# Patient Record
Sex: FEMALE | Race: BLACK OR AFRICAN AMERICAN | Employment: UNEMPLOYED | RURAL
[De-identification: names, ages, dates, MRNs, and addresses within clinical notes are randomized per-mention and may not be internally consistent; named-entity substitution may affect disease eponyms.]

---

## 2021-04-24 ENCOUNTER — HOSPITAL ENCOUNTER (INPATIENT)
Age: 26
LOS: 6 days | Discharge: HOME | DRG: 885 | End: 2021-04-30
Attending: PSYCHIATRY & NEUROLOGY | Admitting: PSYCHIATRY & NEUROLOGY
Payer: OTHER GOVERNMENT

## 2021-04-24 ENCOUNTER — HOSPITAL ENCOUNTER (EMERGENCY)
Age: 26
Discharge: PSYCHIATRIC OR SUBSTANCE USE FACILITY | End: 2021-04-24
Attending: EMERGENCY MEDICINE
Payer: OTHER GOVERNMENT

## 2021-04-24 VITALS
HEIGHT: 64 IN | HEART RATE: 84 BPM | WEIGHT: 167 LBS | SYSTOLIC BLOOD PRESSURE: 121 MMHG | DIASTOLIC BLOOD PRESSURE: 80 MMHG | OXYGEN SATURATION: 98 % | RESPIRATION RATE: 17 BRPM | TEMPERATURE: 98.2 F | BODY MASS INDEX: 28.51 KG/M2

## 2021-04-24 DIAGNOSIS — R45.851 SUICIDAL IDEATION: Primary | ICD-10-CM

## 2021-04-24 DIAGNOSIS — F43.10 PTSD (POST-TRAUMATIC STRESS DISORDER): ICD-10-CM

## 2021-04-24 DIAGNOSIS — F33.2 SEVERE EPISODE OF RECURRENT MAJOR DEPRESSIVE DISORDER, WITHOUT PSYCHOTIC FEATURES (HCC): ICD-10-CM

## 2021-04-24 PROBLEM — F32.9 MAJOR DEPRESSION: Status: ACTIVE | Noted: 2021-04-24

## 2021-04-24 LAB
ALBUMIN SERPL-MCNC: 4.4 G/DL (ref 3.4–5)
ALBUMIN/GLOB SERPL: 1.3 {RATIO} (ref 0.8–1.7)
ALP SERPL-CCNC: 49 U/L (ref 45–117)
ALT SERPL-CCNC: 23 U/L (ref 13–56)
AMPHET UR QL SCN: NEGATIVE
ANION GAP SERPL CALC-SCNC: 7 MMOL/L (ref 3–18)
APAP SERPL-MCNC: <2 UG/ML (ref 10–30)
APPEARANCE UR: CLEAR
AST SERPL-CCNC: 12 U/L (ref 10–38)
BACTERIA URNS QL MICRO: ABNORMAL /HPF
BARBITURATES UR QL SCN: NEGATIVE
BASOPHILS # BLD: 0 K/UL (ref 0–0.1)
BASOPHILS NFR BLD: 1 % (ref 0–2)
BENZODIAZ UR QL: NEGATIVE
BILIRUB SERPL-MCNC: 0.3 MG/DL (ref 0.2–1)
BILIRUB UR QL: NEGATIVE
BUN SERPL-MCNC: 10 MG/DL (ref 7–18)
BUN/CREAT SERPL: 10 (ref 12–20)
CALCIUM SERPL-MCNC: 9.7 MG/DL (ref 8.5–10.1)
CANNABINOIDS UR QL SCN: NEGATIVE
CHLORIDE SERPL-SCNC: 103 MMOL/L (ref 100–111)
CO2 SERPL-SCNC: 27 MMOL/L (ref 21–32)
COCAINE UR QL SCN: NEGATIVE
COLOR UR: YELLOW
COVID-19 RAPID TEST, COVR: NOT DETECTED
CREAT SERPL-MCNC: 0.98 MG/DL (ref 0.6–1.3)
DIFFERENTIAL METHOD BLD: ABNORMAL
EOSINOPHIL # BLD: 0.1 K/UL (ref 0–0.4)
EOSINOPHIL NFR BLD: 1 % (ref 0–5)
EPITH CASTS URNS QL MICRO: ABNORMAL /LPF (ref 0–5)
ERYTHROCYTE [DISTWIDTH] IN BLOOD BY AUTOMATED COUNT: 14.6 % (ref 11.6–14.5)
ETHANOL SERPL-MCNC: <3 MG/DL (ref 0–3)
FLUAV RNA SPEC QL NAA+PROBE: NOT DETECTED
FLUBV RNA SPEC QL NAA+PROBE: NOT DETECTED
GLOBULIN SER CALC-MCNC: 3.3 G/DL (ref 2–4)
GLUCOSE SERPL-MCNC: 102 MG/DL (ref 74–99)
GLUCOSE UR STRIP.AUTO-MCNC: NEGATIVE MG/DL
HCG SERPL QL: NEGATIVE
HCT VFR BLD AUTO: 43.4 % (ref 35–45)
HDSCOM,HDSCOM: NORMAL
HGB BLD-MCNC: 14.1 G/DL (ref 12–16)
HGB UR QL STRIP: ABNORMAL
KETONES UR QL STRIP.AUTO: NEGATIVE MG/DL
LEUKOCYTE ESTERASE UR QL STRIP.AUTO: NEGATIVE
LYMPHOCYTES # BLD: 2 K/UL (ref 0.9–3.6)
LYMPHOCYTES NFR BLD: 33 % (ref 21–52)
MCH RBC QN AUTO: 26.4 PG (ref 24–34)
MCHC RBC AUTO-ENTMCNC: 32.5 G/DL (ref 31–37)
MCV RBC AUTO: 81.3 FL (ref 74–97)
METHADONE UR QL: NEGATIVE
MONOCYTES # BLD: 0.3 K/UL (ref 0.05–1.2)
MONOCYTES NFR BLD: 5 % (ref 3–10)
NEUTS SEG # BLD: 3.6 K/UL (ref 1.8–8)
NEUTS SEG NFR BLD: 61 % (ref 40–73)
NITRITE UR QL STRIP.AUTO: NEGATIVE
OPIATES UR QL: NEGATIVE
PCP UR QL: NEGATIVE
PH UR STRIP: 6 [PH] (ref 5–8)
PLATELET # BLD AUTO: 271 K/UL (ref 135–420)
PMV BLD AUTO: 10.4 FL (ref 9.2–11.8)
POTASSIUM SERPL-SCNC: 4.1 MMOL/L (ref 3.5–5.5)
PROT SERPL-MCNC: 7.7 G/DL (ref 6.4–8.2)
PROT UR STRIP-MCNC: NEGATIVE MG/DL
RBC # BLD AUTO: 5.34 M/UL (ref 4.2–5.3)
RBC #/AREA URNS HPF: ABNORMAL /HPF (ref 0–5)
SALICYLATES SERPL-MCNC: <1.7 MG/DL (ref 2.8–20)
SARS-COV-2, COV2: NOT DETECTED
SODIUM SERPL-SCNC: 137 MMOL/L (ref 136–145)
SOURCE, COVRS: NORMAL
SP GR UR REFRACTOMETRY: 1.01 (ref 1–1.03)
UROBILINOGEN UR QL STRIP.AUTO: 0.2 EU/DL (ref 0.2–1)
WBC # BLD AUTO: 5.9 K/UL (ref 4.6–13.2)
WBC URNS QL MICRO: ABNORMAL /HPF (ref 0–5)

## 2021-04-24 PROCEDURE — 85025 COMPLETE CBC W/AUTO DIFF WBC: CPT

## 2021-04-24 PROCEDURE — 80143 DRUG ASSAY ACETAMINOPHEN: CPT

## 2021-04-24 PROCEDURE — 87636 SARSCOV2 & INF A&B AMP PRB: CPT

## 2021-04-24 PROCEDURE — 80053 COMPREHEN METABOLIC PANEL: CPT

## 2021-04-24 PROCEDURE — 82077 ASSAY SPEC XCP UR&BREATH IA: CPT

## 2021-04-24 PROCEDURE — 84703 CHORIONIC GONADOTROPIN ASSAY: CPT

## 2021-04-24 PROCEDURE — 87635 SARS-COV-2 COVID-19 AMP PRB: CPT

## 2021-04-24 PROCEDURE — 65220000003 HC RM SEMIPRIVATE PSYCH

## 2021-04-24 PROCEDURE — 80179 DRUG ASSAY SALICYLATE: CPT

## 2021-04-24 PROCEDURE — 74011250637 HC RX REV CODE- 250/637: Performed by: PSYCHIATRY & NEUROLOGY

## 2021-04-24 PROCEDURE — 80307 DRUG TEST PRSMV CHEM ANLYZR: CPT

## 2021-04-24 PROCEDURE — 99285 EMERGENCY DEPT VISIT HI MDM: CPT

## 2021-04-24 PROCEDURE — 81001 URINALYSIS AUTO W/SCOPE: CPT

## 2021-04-24 PROCEDURE — 99291 CRITICAL CARE FIRST HOUR: CPT

## 2021-04-24 RX ORDER — IBUPROFEN 200 MG
1 TABLET ORAL
Status: DISCONTINUED | OUTPATIENT
Start: 2021-04-24 | End: 2021-04-30 | Stop reason: HOSPADM

## 2021-04-24 RX ORDER — TRAZODONE HYDROCHLORIDE 50 MG/1
50 TABLET ORAL
Status: DISCONTINUED | OUTPATIENT
Start: 2021-04-24 | End: 2021-04-24

## 2021-04-24 RX ORDER — ADHESIVE BANDAGE
30 BANDAGE TOPICAL DAILY PRN
Status: DISCONTINUED | OUTPATIENT
Start: 2021-04-24 | End: 2021-04-30 | Stop reason: HOSPADM

## 2021-04-24 RX ORDER — LORAZEPAM 2 MG/ML
2 INJECTION INTRAMUSCULAR
Status: DISCONTINUED | OUTPATIENT
Start: 2021-04-24 | End: 2021-04-26

## 2021-04-24 RX ORDER — ACETAMINOPHEN 325 MG/1
650 TABLET ORAL
Status: DISCONTINUED | OUTPATIENT
Start: 2021-04-24 | End: 2021-04-30 | Stop reason: HOSPADM

## 2021-04-24 RX ORDER — HYDROXYZINE PAMOATE 50 MG/1
50 CAPSULE ORAL
Status: DISCONTINUED | OUTPATIENT
Start: 2021-04-24 | End: 2021-04-30 | Stop reason: HOSPADM

## 2021-04-24 RX ORDER — MIRTAZAPINE 15 MG/1
15 TABLET, ORALLY DISINTEGRATING ORAL
Status: DISCONTINUED | OUTPATIENT
Start: 2021-04-24 | End: 2021-04-30 | Stop reason: HOSPADM

## 2021-04-24 RX ORDER — MAG HYDROX/ALUMINUM HYD/SIMETH 200-200-20
30 SUSPENSION, ORAL (FINAL DOSE FORM) ORAL
Status: DISCONTINUED | OUTPATIENT
Start: 2021-04-24 | End: 2021-04-30 | Stop reason: HOSPADM

## 2021-04-24 RX ORDER — LORAZEPAM 1 MG/1
1 TABLET ORAL
Status: DISCONTINUED | OUTPATIENT
Start: 2021-04-24 | End: 2021-04-25

## 2021-04-24 RX ORDER — IBUPROFEN 400 MG/1
400 TABLET ORAL
Status: DISCONTINUED | OUTPATIENT
Start: 2021-04-24 | End: 2021-04-30 | Stop reason: HOSPADM

## 2021-04-24 RX ADMIN — MIRTAZAPINE 15 MG: 15 TABLET, ORALLY DISINTEGRATING ORAL at 21:50

## 2021-04-24 RX ADMIN — LORAZEPAM 1 MG: 1 TABLET ORAL at 21:43

## 2021-04-24 RX ADMIN — IBUPROFEN 400 MG: 400 TABLET, FILM COATED ORAL at 21:14

## 2021-04-24 NOTE — ED PROVIDER NOTES
EMERGENCY DEPARTMENT HISTORY AND PHYSICAL EXAM    Date: 4/24/2021  Patient Name: Alfa Babcock    History of Presenting Illness     Chief Complaint   Patient presents with    Suicidal         History Provided By: Patient and EMS    Additional History (Context):   6:49 AM  Susanna Carlos is a 23 y.o. female with PMHX of alcohol abuse, PTSD, depression with previous suicide attempts, who presents to the emergency department C/O suicidal ideation. She has a history of self-mutilation including cutting herself attempt of jumping out the window and drug overdoses. She currently has thoughts of attempting to cut her throat. She has been out of her psychiatric medications which include naltrexone shots once a week as well as bupropion or Lamictal.  She has been off and on medications for quite some time but states she has been out of her meds for several months. She states her depression suicidal thoughts have escalated to the point where she needs admission. After discussion with her  who she lives with Radha Powell they electively called 911 for transfer here evaluation to see placement. She has a significant history of alcohol abuse but is not currently drinking. She has a positive family history of depression and a cousin who attempted suicide by aggressively causing  in hopes of being killed. She also has friends who have attempted but not successfully committed suicide    Social History  Acknowledges being a cigarette smoker and alcohol user but denies illicit drugs. Family History  Positive for depression and suicidal gestures as mentioned above      PCP: None        Past History     Past Medical History:  Past Medical History:   Diagnosis Date    Asthma     Nicotine vapor product user        Past Surgical History:  Past Surgical History:   Procedure Laterality Date    HX APPENDECTOMY      HX ORTHOPAEDIC      carpal tunnel release       Family History:  History reviewed.  No pertinent family history. Social History:  Social History     Tobacco Use    Smoking status: Never Smoker    Smokeless tobacco: Never Used   Substance Use Topics    Alcohol use: Not Currently     Comment: occassional    Drug use: Never       Allergies:  No Known Allergies      Review of Systems   Review of Systems   Constitutional: Negative for chills and fever. Psychiatric/Behavioral: Positive for behavioral problems, dysphoric mood and sleep disturbance. All other systems reviewed and are negative. Physical Exam     Vitals:    04/24/21 0500 04/24/21 0702 04/24/21 0800 04/24/21 0900   BP: 121/78 106/64 105/64 108/65   Pulse:       Resp:       Temp:       SpO2: 100% 100% 100% 99%   Weight:       Height:         Physical Exam  Vitals signs and nursing note reviewed. Constitutional:       General: She is not in acute distress. Appearance: She is well-developed. She is not diaphoretic. HENT:      Head: Normocephalic and atraumatic. Eyes:      General: No scleral icterus. Extraocular Movements:      Right eye: Normal extraocular motion. Left eye: Normal extraocular motion. Conjunctiva/sclera: Conjunctivae normal.      Pupils: Pupils are equal, round, and reactive to light. Neck:      Musculoskeletal: Normal range of motion and neck supple. Trachea: No tracheal deviation. Cardiovascular:      Rate and Rhythm: Normal rate and regular rhythm. Heart sounds: Normal heart sounds. Pulmonary:      Effort: Pulmonary effort is normal. No respiratory distress. Breath sounds: Normal breath sounds. No stridor. Abdominal:      General: Bowel sounds are normal. There is no distension. Palpations: Abdomen is soft. Tenderness: There is no abdominal tenderness. There is no rebound. Musculoskeletal: Normal range of motion. General: No tenderness. Comments: Grossly unremarkable without abnormalities   Skin:     General: Skin is warm and dry. Capillary Refill: Capillary refill takes less than 2 seconds. Findings: No erythema or rash. Neurological:      Mental Status: She is alert and oriented to person, place, and time. GCS: GCS eye subscore is 4. GCS verbal subscore is 5. GCS motor subscore is 6. Cranial Nerves: No cranial nerve deficit. Motor: Motor function is intact. No weakness. Coordination: Coordination is intact. Gait: Gait is intact. Psychiatric:         Mood and Affect: Mood normal. Affect is flat. Speech: Speech is delayed. Behavior: Behavior is slowed. Judgment: Judgment normal.      Comments: She seems to have good insight and knowledge of her history but is quiet withdrawn with flat affect and essentially no eye contact.        Diagnostic Study Results     Labs -     Recent Results (from the past 12 hour(s))   URINALYSIS W/ RFLX MICROSCOPIC    Collection Time: 04/24/21  6:50 AM   Result Value Ref Range    Color YELLOW      Appearance CLEAR      Specific gravity 1.009 1.005 - 1.030      pH (UA) 6.0 5.0 - 8.0      Protein Negative NEG mg/dL    Glucose Negative NEG mg/dL    Ketone Negative NEG mg/dL    Bilirubin Negative NEG      Blood SMALL (A) NEG      Urobilinogen 0.2 0.2 - 1.0 EU/dL    Nitrites Negative NEG      Leukocyte Esterase Negative NEG     DRUG SCREEN, URINE    Collection Time: 04/24/21  6:50 AM   Result Value Ref Range    BENZODIAZEPINES Negative NEG      BARBITURATES Negative NEG      THC (TH-CANNABINOL) Negative NEG      OPIATES Negative NEG      PCP(PHENCYCLIDINE) Negative NEG      COCAINE Negative NEG      AMPHETAMINES Negative NEG      METHADONE Negative NEG      HDSCOM (NOTE)    URINE MICROSCOPIC ONLY    Collection Time: 04/24/21  6:50 AM   Result Value Ref Range    WBC 0 to 3 0 - 5 /hpf    RBC 0 to 3 0 - 5 /hpf    Epithelial cells 2+ 0 - 5 /lpf    Bacteria 2+ (A) NEG /hpf   CBC WITH AUTOMATED DIFF    Collection Time: 04/24/21  6:55 AM   Result Value Ref Range    WBC 5.9 4.6 - 13.2 K/uL    RBC 5.34 (H) 4.20 - 5.30 M/uL    HGB 14.1 12.0 - 16.0 g/dL    HCT 43.4 35.0 - 45.0 %    MCV 81.3 74.0 - 97.0 FL    MCH 26.4 24.0 - 34.0 PG    MCHC 32.5 31.0 - 37.0 g/dL    RDW 14.6 (H) 11.6 - 14.5 %    PLATELET 146 767 - 114 K/uL    MPV 10.4 9.2 - 11.8 FL    NEUTROPHILS 61 40 - 73 %    LYMPHOCYTES 33 21 - 52 %    MONOCYTES 5 3 - 10 %    EOSINOPHILS 1 0 - 5 %    BASOPHILS 1 0 - 2 %    ABS. NEUTROPHILS 3.6 1.8 - 8.0 K/UL    ABS. LYMPHOCYTES 2.0 0.9 - 3.6 K/UL    ABS. MONOCYTES 0.3 0.05 - 1.2 K/UL    ABS. EOSINOPHILS 0.1 0.0 - 0.4 K/UL    ABS. BASOPHILS 0.0 0.0 - 0.1 K/UL    DF AUTOMATED     METABOLIC PANEL, COMPREHENSIVE    Collection Time: 04/24/21  6:55 AM   Result Value Ref Range    Sodium 137 136 - 145 mmol/L    Potassium 4.1 3.5 - 5.5 mmol/L    Chloride 103 100 - 111 mmol/L    CO2 27 21 - 32 mmol/L    Anion gap 7 3.0 - 18 mmol/L    Glucose 102 (H) 74 - 99 mg/dL    BUN 10 7.0 - 18 MG/DL    Creatinine 0.98 0.6 - 1.3 MG/DL    BUN/Creatinine ratio 10 (L) 12 - 20      GFR est AA >60 >60 ml/min/1.73m2    GFR est non-AA >60 >60 ml/min/1.73m2    Calcium 9.7 8.5 - 10.1 MG/DL    Bilirubin, total 0.3 0.2 - 1.0 MG/DL    ALT (SGPT) 23 13 - 56 U/L    AST (SGOT) 12 10 - 38 U/L    Alk.  phosphatase 49 45 - 117 U/L    Protein, total 7.7 6.4 - 8.2 g/dL    Albumin 4.4 3.4 - 5.0 g/dL    Globulin 3.3 2.0 - 4.0 g/dL    A-G Ratio 1.3 0.8 - 1.7     HCG QL SERUM    Collection Time: 04/24/21  6:55 AM   Result Value Ref Range    HCG, Ql. Negative NEG     ACETAMINOPHEN    Collection Time: 04/24/21  6:55 AM   Result Value Ref Range    Acetaminophen level <2 (L) 10.0 - 47.4 ug/mL   SALICYLATE    Collection Time: 04/24/21  6:55 AM   Result Value Ref Range    Salicylate level <4.5 (L) 2.8 - 20.0 MG/DL   ETHYL ALCOHOL    Collection Time: 04/24/21  6:55 AM   Result Value Ref Range    ALCOHOL(ETHYL),SERUM <3 0 - 3 MG/DL   COVID-19 RAPID TEST    Collection Time: 04/24/21  8:20 AM   Result Value Ref Range    Specimen source Nasopharyngeal      COVID-19 rapid test Not detected NOTD         Radiologic Studies -   No orders to display     CT Results  (Last 48 hours)    None        CXR Results  (Last 48 hours)    None          Medications given in the ED-  Medications - No data to display      Medical Decision Making   I am the first provider for this patient. I reviewed the vital signs, available nursing notes, past medical history, past surgical history, family history and social history. Vital Signs-Reviewed the patient's vital signs. Pulse Oximetry Analysis -100% on room air    Records Reviewed: NURSING NOTES AND PREVIOUS MEDICAL RECORDS    Provider Notes (Medical Decision Making): This is a high-risk PTSD alcohol abuse patient with previous attempts who has suicidal thoughts and ideations. She is voluntary and seeking treatment. By physical exam she is clear awake alert and oriented without alterations. Physical exam revealed no evidence of confabulating metabolic factors. Blood work was sent to look for salts electrolytes other issues from a clinical aspect she is medically cleared. We will ask case management to initiate treatment for her. At this point she is calm cooperative does not require further medications. Disposition is to seek voluntary transfer for inpatient psychiatric admission. Procedures:  Procedures    ED Course:   6:49 AM: Initial assessment performed. The patients presenting problems have been discussed, and they are in agreement with the care plan formulated and outlined with them. I have encouraged them to ask questions as they arise throughout their visit. CRITICAL CARE NOTE:  7:00 AM  I have spent 55 minutes of critical care time involved in lab review, consultations with specialist, family decision-making, and documentation. During this entire length of time I was immediately available to the patient. Critical Care:   The reason for providing this level of medical care for this critically ill patient was due a critical illness that impaired one or more vital organ systems such that there was a high probability of imminent or life threatening deterioration in the patients condition. This care involved high complexity decision making to assess, manipulate, and support vital system functions, to treat this degreee vital organ system failure and to prevent further life threatening deterioration of the patients condition. SIGN OUT:  6:59 AM  Patient's presentation, labs/imaging and plan of care was reviewed with Dr. Guy Prieto as part of sign out. They will await [facility] management to help with psychiatric disposition and pain treatment, as part of the plan discussed with the patient.     Dr. Estrada Madden assistance in completion of this plan is greatly appreciated but it should be noted that I will be the provider of record for this patient.     Peg Camara DO    7:00 AM  Care was endorsed to me at usual shift change. Patient here in the ED for acute suicidal ideations. Patient voluntary for Psych placement. Currently awaiting case management evaluation. Peg Camara DO    Patient accepted by [facility] inpatient psych, Dr. Lori Tuttle. Patient be transferred there to complete her psychiatric treatment. Diagnosis and Disposition     TRANSFER PROGRESS NOTE:  10:20 AM    Discussed impending transfer with patient. Patient was instructed that [facility] not have inpatient psychiatric services services and that patient would be transferred to [facility] inpatient psychiatry. Patient understands and agrees with care plan. DISCHARGE NOTE:    CLINICAL IMPRESSION:    1. Suicidal ideation    2. Severe episode of recurrent major depressive disorder, without psychotic features ([address]    3. PTSD (post-traumatic stress disorder)        PLAN:  1. Transfer to [facility] psych  2. There are no discharge medications for this patient.     Follow-up Information    None _______________________________    This note was partially transcribed via voice recognition software. Although efforts have been made to catch any discrepancies, it may contain sound alike words, grammatical errors, or nonsensical words.

## 2021-04-24 NOTE — ED NOTES
Update called to receiving facility, Spoke with Moshe Hudson. All patient belongings taken by Howard Burgess.  All paperwork signed by RN Sup.

## 2021-04-24 NOTE — PROGRESS NOTES
DC Plan:  [facility] [place]  Number for report:   Accepting provider: MD Wilfrid Khoury    Chart accessed as CM on call. Page received for voluntary psych placement. Referrals called into [facility] @ [facility] and [facility] @ [address] [place] [address]. Provided ED direct number. CM will cont to follow for psych placement. If pt becomes involuntary please involve [facility]. CM available via hospital .     [facility] with [facility] @ [address] [place] can accept, ER RN made aware

## 2021-04-25 PROBLEM — F33.2 MAJOR DEPRESSIVE DISORDER, RECURRENT EPISODE, SEVERE (HCC): Status: ACTIVE | Noted: 2021-04-24

## 2021-04-25 PROBLEM — F10.20 ALCOHOL DEPENDENCE (HCC): Status: ACTIVE | Noted: 2021-04-25

## 2021-04-25 PROBLEM — F43.10 POSTTRAUMATIC STRESS DISORDER: Status: ACTIVE | Noted: 2021-04-25

## 2021-04-25 PROBLEM — F60.3 BORDERLINE PERSONALITY DISORDER (HCC): Status: ACTIVE | Noted: 2021-04-25

## 2021-04-25 LAB
CHOLEST SERPL-MCNC: 172 MG/DL
HDLC SERPL-MCNC: 46 MG/DL
HDLC SERPL: 3.7 {RATIO} (ref 0–5)
LDLC SERPL CALC-MCNC: 107.6 MG/DL (ref 0–100)
LIPID PROFILE,FLP: ABNORMAL
TRIGL SERPL-MCNC: 92 MG/DL (ref ?–150)
TSH SERPL DL<=0.05 MIU/L-ACNC: 0.3 UIU/ML (ref 0.36–3.74)
VLDLC SERPL CALC-MCNC: 18.4 MG/DL

## 2021-04-25 PROCEDURE — 36415 COLL VENOUS BLD VENIPUNCTURE: CPT

## 2021-04-25 PROCEDURE — 65220000003 HC RM SEMIPRIVATE PSYCH

## 2021-04-25 PROCEDURE — 74011250636 HC RX REV CODE- 250/636: Performed by: PSYCHIATRY & NEUROLOGY

## 2021-04-25 PROCEDURE — 84443 ASSAY THYROID STIM HORMONE: CPT

## 2021-04-25 PROCEDURE — 90791 PSYCH DIAGNOSTIC EVALUATION: CPT | Performed by: PSYCHIATRY & NEUROLOGY

## 2021-04-25 PROCEDURE — 74011250637 HC RX REV CODE- 250/637: Performed by: PSYCHIATRY & NEUROLOGY

## 2021-04-25 PROCEDURE — 80061 LIPID PANEL: CPT

## 2021-04-25 PROCEDURE — 74011000250 HC RX REV CODE- 250: Performed by: PSYCHIATRY & NEUROLOGY

## 2021-04-25 PROCEDURE — 93005 ELECTROCARDIOGRAM TRACING: CPT

## 2021-04-25 RX ORDER — ALBUTEROL SULFATE 90 UG/1
2 AEROSOL, METERED RESPIRATORY (INHALATION)
COMMUNITY

## 2021-04-25 RX ORDER — LORAZEPAM 1 MG/1
2 TABLET ORAL
Status: DISCONTINUED | OUTPATIENT
Start: 2021-04-25 | End: 2021-04-26

## 2021-04-25 RX ORDER — BUPROPION HYDROCHLORIDE 150 MG/1
150 TABLET, EXTENDED RELEASE ORAL DAILY
Status: DISCONTINUED | OUTPATIENT
Start: 2021-04-25 | End: 2021-04-27

## 2021-04-25 RX ORDER — NALTREXONE HYDROCHLORIDE 50 MG/1
50 TABLET, FILM COATED ORAL DAILY
Status: DISCONTINUED | OUTPATIENT
Start: 2021-04-25 | End: 2021-04-30 | Stop reason: HOSPADM

## 2021-04-25 RX ORDER — FLUTICASONE PROPIONATE AND SALMETEROL 250; 50 UG/1; UG/1
1 POWDER RESPIRATORY (INHALATION) 2 TIMES DAILY
COMMUNITY

## 2021-04-25 RX ORDER — LANOLIN ALCOHOL/MO/W.PET/CERES
1 CREAM (GRAM) TOPICAL DAILY
Status: DISCONTINUED | OUTPATIENT
Start: 2021-04-25 | End: 2021-04-29

## 2021-04-25 RX ORDER — ONDANSETRON 4 MG/1
4 TABLET, ORALLY DISINTEGRATING ORAL
Status: DISCONTINUED | OUTPATIENT
Start: 2021-04-25 | End: 2021-04-30 | Stop reason: HOSPADM

## 2021-04-25 RX ORDER — ASPIRIN 325 MG/1
100 TABLET, FILM COATED ORAL DAILY
Status: DISCONTINUED | OUTPATIENT
Start: 2021-04-25 | End: 2021-04-29

## 2021-04-25 RX ORDER — ARIPIPRAZOLE 5 MG/1
5 TABLET ORAL
Status: DISCONTINUED | OUTPATIENT
Start: 2021-04-25 | End: 2021-04-26

## 2021-04-25 RX ADMIN — ARIPIPRAZOLE 5 MG: 5 TABLET ORAL at 21:14

## 2021-04-25 RX ADMIN — HYDROXYZINE PAMOATE 50 MG: 50 CAPSULE ORAL at 23:14

## 2021-04-25 RX ADMIN — WATER 10 MG: 1 INJECTION INTRAMUSCULAR; INTRAVENOUS; SUBCUTANEOUS at 17:22

## 2021-04-25 RX ADMIN — LORAZEPAM 2 MG: 1 TABLET ORAL at 14:27

## 2021-04-25 RX ADMIN — MIRTAZAPINE 15 MG: 15 TABLET, ORALLY DISINTEGRATING ORAL at 21:14

## 2021-04-25 RX ADMIN — NALTREXONE HYDROCHLORIDE 50 MG: 50 TABLET, FILM COATED ORAL at 12:40

## 2021-04-25 RX ADMIN — FOLIC ACID TAB 400 MCG 1 MG: 400 TAB at 12:39

## 2021-04-25 RX ADMIN — ONDANSETRON 4 MG: 4 TABLET, ORALLY DISINTEGRATING ORAL at 15:58

## 2021-04-25 RX ADMIN — BUPROPION HYDROCHLORIDE 150 MG: 150 TABLET, EXTENDED RELEASE ORAL at 12:39

## 2021-04-25 RX ADMIN — THIAMINE HCL TAB 100 MG 100 MG: 100 TAB at 12:40

## 2021-04-25 NOTE — BH NOTES
PRN Medication Documentation    Specific patient behavior that led to need for PRN medication: 0974 pt sitting on bed with glazed expression repeating, \"They won't leave me alone. \"   Staff interventions attempted prior to PRN being given: redirect, 1700 VS taken , Notified Dr. Jesus Laguna. Instructed to admin Geodon if pt progressed. Pt. Stated, \"I'm seeing and hearing people that are not here. They keep following me. \"  PRN medication given: Geodon 10mg IM at 1722   Patient response/effectiveness of PRN medication: Pt resting quietly.  Denies AVH

## 2021-04-25 NOTE — MASTER TREATMENT PLAN
[facility]  Master Treatment Plan for Shania Ravena    Date Treatment Plan Initiated: 04/24/2021    Treatment Plan Modalities:  Type of Modality Amount  (x minutes) Frequency (x/week) Duration (x days) Name of Responsible Staff   Community & wrap-up meetings to encourage peer interactions 30 14 7 Antolin Delacruz., CNA/MHT  Srikanth Willoughby., MHT   Group psychotherapy to assist in building coping skills and internal controls 60 5 5 Rahul Roque., ROLDAN Teixeira., [facility]   Therapeutic activity groups to build coping skills 61 7 7 Isabell Weathers., MHT     Psychoeducation in group setting to address:   Medication education  Coping Skills  Symptom Management   60 7 7 Rahul Roque., ROLDAN Teixeira., [facility]  Lor Young., RN  Chika Soliman RN   Discharge planning   60 2 2 Sherrie Keller.,    Spirituality    60 2 2 Josie Schuster.   Physician medication management   15 7 7 ROBIN Alejandre MD  Z. [address] MD                                       Treatment Team Signatures    I have participated in the development of this plan of treatment and agree to its implementation.     Patient Signature       Patient Printed Name Date/Time   Social Work/Therapist Signature       Social Work/Therapist Printed Name Date/Time   RN Signature       RN Printed Name      Sridhar Kaiser RN Date/Time      04/24/21/2032   Other Signature     Other Signature Date/Time   MD Signature       MD Printed Name Date/Time

## 2021-04-25 NOTE — PROGRESS NOTES
Problem: Falls - Risk of  Goal: *Absence of Falls  Description: Document Lalit Shove Fall Risk and appropriate interventions in the flowsheet. Outcome: Progressing Towards Goal  Note: Fall Risk Interventions:            Medication Interventions: Patient to call before getting OOB, Teach patient to arise slowly                   Problem: Patient Education: Go to Patient Education Activity  Goal: Patient/Family Education  Outcome: Progressing Towards Goal     Problem: Risk of Harm to Self or Others  Goal: *STG: Patient will express feelings of depression, suicide, or self-harm without acting out  Description: Patient will express feelings of depression, suicide, or self-harm without acting out. Comment on how this will be achieved.   Outcome: Progressing Towards Goal     Problem: Depressed Mood (Adult/Pediatric)  Goal: *STG: Participates in treatment plan  Outcome: Progressing Towards Goal

## 2021-04-25 NOTE — BH NOTES
Patient did not attend nor participate in wrap-up community meeting with her peers this evening. Patient was still in admission process.

## 2021-04-25 NOTE — BH NOTES
Admission Note:  Pt arrived on unit at 2003 on a voluntary admission from [facility] ED. Pt is alert and oriented x 4 and ambulatory. Dr. Pablo Cook, , and Nursing Supervisor notified. Dr. Jw Rowe will be notified by the Nursing Supervisor of pt's arrival in the morning. Pt denied having a license with the [address] Pt smokes two cigarettes and vapes with nicotine, daily. . Reviewed smoking cessation information with pt to include danger situations, coping skills, and information on quitting. Pt verbalized understanding. Received order for Nicotine patch. Pt drinks one pint of alcohol daily. Counseled pt on alcohol use/abuse. Treatment to focus on decreasing SI, decreasing depression, increasing coping skills, ,  Medication mgmt, mgmt of anxiety, and group therapy. Pt placed on suicide and fall precautions and q 15 minutes safety checks. Pt was provided a mask and received education including the benefits of wearing a mask while hospitalized. Pt was also encouraged to practice social distancing to ensure safety of self, staff and peers. Pt verbalized understanding.

## 2021-04-25 NOTE — BH NOTES
Dr. Oswaldo Hollins [facility]) notified of pt UA of +2 bacteria 4/24.  No new orders

## 2021-04-25 NOTE — BH NOTES
Shift change report given to Lou Wiggins RN re: SBAR, medications, and behavior.   Lazaro fall risk score 1

## 2021-04-25 NOTE — PROGRESS NOTES
Problem: Depressed Mood (Adult/Pediatric)    Goal: *STG: Complies with medication therapy    Affect constricted, mood depressed/anxious. Endorsing SI without a plan. Medication compliant without incident. Assessed/monitored potential harm to self. Encouraged sharing of feelings. Monitored medication compliance and effectiveness. Assisted pt in developing coping strategies. Offered encouragement, reassurance, and support.     Outcome: Progressing Towards Goal

## 2021-04-25 NOTE — BH NOTES
Affect constricted, mood depressed/anxious. Endorsing SI without a plan. + Visual hallucinations. \" I am seeing shadows. \" Pt stated, \" I was hearing voices last week. \" + Paranoia. Believes that \"people are out to get me. \"  Medication compliant. Co-operative. PRN Medication Documentation    Specific patient behavior that led to need for PRN medication: C/o back and right arm pain at 7/10. Staff interventions attempted prior to PRN being given: Assessed pt. PRN medication given: Motrin 400 mg PO at 2114. Patient response/effectiveness of PRN medication: Motrin was effective. PRN Medication Documentation    Specific patient behavior that led to need for PRN medication: C/o moderate anxiety  Staff interventions attempted prior to PRN being given: Assessed pt. PRN medication given: Ativan 1 mg PO at 2143. Patient response/effectiveness of PRN medication: Ativan was effective. Pt rested quietly in bed with eyes closed for 6.75 hours. Respirations even and unlabored. Pt in no apparent distress.

## 2021-04-25 NOTE — BH NOTES
Patient did not attend nor participate in community meeting this morning. Patient was resting at time of meeting.

## 2021-04-25 NOTE — H&P
History & Physical    Primary Care Provider: None  Source of Information: Patient/family     History of Presenting Illness:   Gely Nova is a 23 y.o. female who was admitted to the behavioral health unit yesterday. She has a history of alcohol abuse, PTSD and depression with previous suicide attempts who came to the ED at [facility] yesterday with suicidal ideation. She had thoughts of attempting to cut her throat. She had been out of her psychiatric medications. She reported she was hearing some voices as well as having hallucinations. Patient reports a history of asthma, for which she uses albuterol and Advair    Alcohol abuse, drinks a pint of Poncho Stringer daily     Review of Systems:  A comprehensive review of systems was negative except for that written in the History of Present Illness. Past Medical History:   Diagnosis Date    Alcohol abuse     Asthma     Nicotine vapor product user         Past Surgical History:   Procedure Laterality Date    HX APPENDECTOMY      HX APPENDECTOMY      HX ORTHOPAEDIC      carpal tunnel release       Prior to Admission medications    Medication Sig Start Date End Date Taking? Authorizing Provider   albuterol (PROVENTIL HFA, VENTOLIN HFA, PROAIR HFA) 90 mcg/actuation inhaler Take 2 Puffs by inhalation every six (6) hours as needed for Wheezing. Yes Provider, Historical   fluticasone propion-salmeteroL (Advair Diskus) 250-50 mcg/dose diskus inhaler Take 1 Puff by inhalation two (2) times a day. Yes Provider, Historical       No Known Allergies     History reviewed. No pertinent family history.      Social History     Socioeconomic History    Marital status:      Spouse name: Not on file    Number of children: Not on file    Years of education: Not on file    Highest education level: Not on file   Tobacco Use    Smoking status: Current Every Day Smoker     Packs/day: 0.25    Smokeless tobacco: Never Used Substance and Sexual Activity    Alcohol use: Yes     Comment: 1 pint of whiskey daily    Drug use: Never   Other Topics Concern            CODE STATUS:  DNR    Full    Other      Objective:     Physical Exam:     Visit Vitals  /60 (BP 1 Location: Left arm, BP Patient Position: Sitting)   Pulse 65   Temp 97.3 °F (36.3 °C)   Resp 17   Ht 5' 4\" (1.626 m)   Wt 72.6 kg (160 lb)   SpO2 99%   Breastfeeding No   BMI 27.46 kg/m²           General:  Alert, cooperative, no distress, appears stated age. Head:  Normocephalic, without obvious abnormality, atraumatic. Eyes:  Conjunctivae/corneas clear. PERRL, EOMs intact. Nose: Nares normal. Septum midline. Mucosa normal. No drainage or sinus tenderness. Throat: Lips, mucosa, and tongue normal. Teeth and gums normal.   Neck: Supple, symmetrical, trachea midline, no adenopathy, thyroid: no enlargement/tenderness/nodules, no carotid bruit and no JVD. Back:   Symmetric, no curvature. ROM normal. No CVA tenderness. Lungs:   Clear to auscultation bilaterally. Chest wall:  No tenderness or deformity. Heart:  Regular rate and rhythm, S1, S2 normal, no murmur, click, rub or gallop. Abdomen:   Soft, non-tender. Bowel sounds normal. No masses,  No organomegaly. Extremities: Extremities normal, atraumatic, no cyanosis or edema. Pulses: 2+ and symmetric all extremities. Skin: Skin color, texture, turgor normal. No rashes or lesions   Neurologic: CNII-XII intact. No motor or sensory deficits.         24 Hour Results:    Recent Results (from the past 24 hour(s))   COVID-19 WITH INFLUENZA A/B    Collection Time: 04/24/21  9:30 PM   Result Value Ref Range    SARS-CoV-2 Not detected NOTD      Influenza A by PCR Not detected NOTD      Influenza B by PCR Not detected NOTD     LIPID PANEL    Collection Time: 04/25/21  7:28 AM   Result Value Ref Range    LIPID PROFILE          Cholesterol, total 172 <200 MG/DL    Triglyceride 92 <150 MG/DL    HDL Cholesterol 46 MG/DL LDL, calculated 107.6 (H) 0 - 100 MG/DL    VLDL, calculated 18.4 MG/DL    CHOL/HDL Ratio 3.7 0.0 - 5.0           Imaging:   No orders to display           Assessment:   Depression and PTSD with suicidal ideation    History of alcohol abuse    History of asthma, stable    Tobacco abuse      Plan:   I will order Symbicort and an albuterol inhaler as needed  Monitor for signs of alcohol withdrawal, none at present    Patient  otherwise stable from a medical standpoint      Home medications were reviewed    Signed By: Genesis Fontaine MD     April 25, 2021
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
## 2021-04-25 NOTE — BH NOTES
Affect constricted/flat, mood depressed/anxious. Alert and Oriented X 4. Cooperative and guarded. Pt laid in bed most of morning, missing breakfast and group. Speech is soft, avoids eye contact, appearance wnl. Pt. Extremely guarded and isolative at 1410, pt stated feeling suspicious of the people on this unit while sitting on floor rocking back and forth. Pt. Stated having alcohol withdrawals from drinking a pint of riky miranda every day. Pt admitted having severe withdrawals in the past.  Admint. Ativan 2mg po at 1427 and notified Dr. Ethan Pace and Nursing Supervisor. VS WNL. Will continue to monitor. Attended and participated in group. Interacted with staff when prompted. Makes needs known. Ate all meals and snacks. Denied SI/HI/VH. Positive for AH. Pt. Reported pain in back 2/10 and refused medication. Encouraged pt to reposition. Medication compliant. Stable with no s/s of distress. PRN Medication Documentation    Specific patient behavior that led to need for PRN medication: Nicotine withdrawal  Staff interventions attempted prior to PRN being given: requested  PRN medication given: nicotine patch 21mg at   Patient response/effectiveness of PRN medication: tolerated        PRN Medication Documentation    Specific patient behavior that led to need for PRN medication: anxious, on floor rocking back and forth, paranoid, pt reported s/s of ETOH withdrawal  Staff interventions attempted prior to PRN being given: VS WNL, Notified   PRN medication given: Ativan 2mg po at 1427  Patient response/effectiveness of PRN medication: tolerated.  Pt stable with s/s of distress    PRN Medication Documentation    Specific patient behavior that led to need for PRN medication: c/o Nausea  Staff interventions attempted prior to PRN being given: requested  PRN medication given: zofran 4 mg sublingual   Patient response/effectiveness of PRN medication: tolerated

## 2021-04-25 NOTE — ROUTINE PROCESS
Shift change report given to Masha Trevino RN, re: SBAR, medications, and behavior. Lazaro Fall Risk Score - 1.

## 2021-04-26 PROCEDURE — 77030029684 HC NEB SM VOL KT MONA -A

## 2021-04-26 PROCEDURE — 65220000003 HC RM SEMIPRIVATE PSYCH

## 2021-04-26 PROCEDURE — 74011250637 HC RX REV CODE- 250/637: Performed by: INTERNAL MEDICINE

## 2021-04-26 PROCEDURE — 74011000250 HC RX REV CODE- 250: Performed by: INTERNAL MEDICINE

## 2021-04-26 PROCEDURE — 94640 AIRWAY INHALATION TREATMENT: CPT

## 2021-04-26 PROCEDURE — 74011250637 HC RX REV CODE- 250/637: Performed by: PSYCHIATRY & NEUROLOGY

## 2021-04-26 PROCEDURE — 99232 SBSQ HOSP IP/OBS MODERATE 35: CPT | Performed by: PSYCHIATRY & NEUROLOGY

## 2021-04-26 RX ORDER — FLUTICASONE PROPIONATE AND SALMETEROL 250; 50 UG/1; UG/1
1 POWDER RESPIRATORY (INHALATION) 2 TIMES DAILY
Status: DISCONTINUED | OUTPATIENT
Start: 2021-04-26 | End: 2021-04-26 | Stop reason: CLARIF

## 2021-04-26 RX ORDER — LORAZEPAM 1 MG/1
1 TABLET ORAL
Status: DISCONTINUED | OUTPATIENT
Start: 2021-04-26 | End: 2021-04-27

## 2021-04-26 RX ORDER — ARIPIPRAZOLE 5 MG/1
10 TABLET ORAL
Status: DISCONTINUED | OUTPATIENT
Start: 2021-04-26 | End: 2021-04-27

## 2021-04-26 RX ORDER — PRAZOSIN HYDROCHLORIDE 1 MG/1
2 CAPSULE ORAL
Status: DISCONTINUED | OUTPATIENT
Start: 2021-04-26 | End: 2021-04-30 | Stop reason: HOSPADM

## 2021-04-26 RX ORDER — ALBUTEROL SULFATE 90 UG/1
2 AEROSOL, METERED RESPIRATORY (INHALATION)
Status: DISCONTINUED | OUTPATIENT
Start: 2021-04-26 | End: 2021-04-30 | Stop reason: HOSPADM

## 2021-04-26 RX ADMIN — HYDROXYZINE PAMOATE 50 MG: 50 CAPSULE ORAL at 08:31

## 2021-04-26 RX ADMIN — ARFORMOTEROL TARTRATE: 15 SOLUTION RESPIRATORY (INHALATION) at 11:48

## 2021-04-26 RX ADMIN — ARIPIPRAZOLE 10 MG: 5 TABLET ORAL at 21:26

## 2021-04-26 RX ADMIN — FOLIC ACID TAB 400 MCG 1 MG: 400 TAB at 08:30

## 2021-04-26 RX ADMIN — PHENYTOIN 2 MG: 125 SUSPENSION ORAL at 21:26

## 2021-04-26 RX ADMIN — NALTREXONE HYDROCHLORIDE 50 MG: 50 TABLET, FILM COATED ORAL at 08:30

## 2021-04-26 RX ADMIN — BUPROPION HYDROCHLORIDE 150 MG: 150 TABLET, EXTENDED RELEASE ORAL at 08:30

## 2021-04-26 RX ADMIN — HYDROXYZINE PAMOATE 50 MG: 50 CAPSULE ORAL at 21:26

## 2021-04-26 RX ADMIN — ARFORMOTEROL TARTRATE: 15 SOLUTION RESPIRATORY (INHALATION) at 20:21

## 2021-04-26 RX ADMIN — LORAZEPAM 1 MG: 1 TABLET ORAL at 18:03

## 2021-04-26 RX ADMIN — MIRTAZAPINE 15 MG: 15 TABLET, ORALLY DISINTEGRATING ORAL at 21:26

## 2021-04-26 RX ADMIN — THIAMINE HCL TAB 100 MG 100 MG: 100 TAB at 08:30

## 2021-04-26 RX ADMIN — IBUPROFEN 400 MG: 400 TABLET, FILM COATED ORAL at 08:31

## 2021-04-26 NOTE — GROUP NOTE
[facility] GROUP DOCUMENTATION INDIVIDUAL                                                                          Group Therapy Note    Date: 4/26/2021    Group Start Time: 1300  Group End Time: 1350  Group Topic: Process Group - Inpatient    [address] [facility] GROUP DOCUMENTATION GROUP    Group Therapy Note  Focus of session was based on an idea developed by Abdirashid Domingo (possibility therapy) and Jaime Stringer, Ph.D. (solution-focused therapy). It is called Do One Thing Different and it is supported by 12 step's motto and definition of insanity is doing the same thing over and over again and expecting different results.   I spoke with group members about the idea is to think about the things you do in a problem situation and change any part you can. We spoke about how we can change our reaction, our attitude, our tone of voice, our behavior, we can think about what others do to help themselves and what works and what has worked for us in the past.  We also spoke about picturing life and what we would feel like without this problem in it. I asked group members to identify one small thing that they can do different and how it can impact their life. Attendance: Attended    Patient's Goal:     Patient will identify 2 feelings or symptoms that might indicate a crisis is beginning to develop              Interventions/techniques: Informed, Validated, Provide feedback and Supported    Follows Directions:  Followed directions    Interactions: Interacted appropriately    Mental Status: Anxious, Calm and Congruent    Behavior/appearance: Attentive, Cooperative and Motivated    Goals Achieved: Able to engage in interactions, Able to listen to others, Able to give feedback to another, Able to reflect/comment on own behavior, Able to receive feedback, Able to self-disclose, Discussed coping, Discussed discharge plans, Discussed self-esteem issues, Discussed safety plan, Displayed empathy, Identified feelings, Identified triggers, Identified medication adherence strategies and Identified resources and support systems      Additional Notes: Pt was attentive and engaged with the other group members. She was quiet but able to talk about her recent two years in the [employer]. She has been experiencing symptoms of PTSD, anxiety. ,depression and suicidal ideation. She states she doesn't like loud noises, flashing lights, too many people in a room and being touched. She had a psychiatry appointment scheduled for today with the [place] but missed it. We made a list of things she wants to do and share with the inpatient staff for coordination of her care. She stated she has her  and one friend as support.     Maribell Rock

## 2021-04-26 NOTE — BH NOTES
Affect constricted, mood depressed. anxious. Attended and participated in group. Ate snacks. Interacting appropriately with staff and peers. Denied SI/HI/VH. + AH, command type. Denied pain. Played Chess with peer. Medication compliant. Pleasant and co-operative. PRN Medication Documentation    Specific patient behavior that led to need for PRN medication: C/o anxiety  Staff interventions attempted prior to PRN being given:Assessed pt  PRN medication given: Vistaril 50 mg PO at 2314. Patient response/effectiveness of PRN medication: Awake, quiet in bed. Vistaril was effective. Pt rested quietly in bed with eyes closed for 5.5 hours. Respirations even and unlabored. Pt in no apparent distress.

## 2021-04-26 NOTE — BH NOTES
Affect blunted/restricted, mood depressed/anxiety. Patient is alert & oriented x 4. Speech clear and coherent. Patient answers questions appropriately. Patient denies SI/HI/VH but continues to have auditory hallucinations. Patient is compliant with medications and PRN's given. Patient appetite good eats 100% of all meals plus snacks. Patient attended and participated in groups with prompting. Patient in no apparent distress. PRN Medication Documentation    Specific patient behavior that led to need for PRN medication: Patient c/o of having anxiety. Staff interventions attempted prior to PRN being given: Assessment done. PRN medication given: Vistaril 50 mg po at 0831 for anxiety. Patient response/effectiveness of PRN medication: Positive effects. PRN Medication Documentation    Specific patient behavior that led to need for PRN medication: Patient c/o lower back pain. Staff interventions attempted prior to PRN being given: Assessment done. PRN medication given: Motrin 400 mg po at 0831 for lower back pain 7/10. Patient response/effectiveness of PRN medication: Positive effects. PRN Medication Documentation    Specific patient behavior that led to need for PRN medication: Patient requested a Nicotine patch. Staff interventions attempted prior to PRN being given: Assessment done. PRN medication given: Nicotine patch 21 mg applied at 0900 to help decrease smoking cessation. Patient response/effectiveness of PRN medication: Positive effects.

## 2021-04-26 NOTE — PROGRESS NOTES
Problem: Risk of Harm to Self or Others    Goal: STG: Patient will limit number of statements of suicidal ideation or intent per day     Affect constricted, mood depressed/anxious. Denied SI or intent to harm self. Assessed/monitored potential harm to self. Encouraged sharing of feelings. Monitored medication compliance and effectiveness. Assisted pt in developing coping strategies. Reinforced small accomplishments. Provided encouragement, reassurance, and support.     Outcome: Progressing Towards Goal

## 2021-04-26 NOTE — PROGRESS NOTES
INTERVAL Hx:  Records and clinical information were reviewed. Still feeling quite depressed and still having AH's and NM's. Has been withdrawn and isolative, but hasn't been volatile or agitated at all. Tolerating the med well without any SE's reported or noted. Discussed her history and sx's further--will increase the Abilify to 10 mg and likely further. Will also restart Prazosin at 2 mg QHS for the NM's. Has received several PRN's due to the [facility] and some possible W/D sx's--VSS and no evidence of W/D now. Slept 5.5 hours last night.     MEDS:  Current Facility-Administered Medications   Medication Dose Route Frequency    albuterol (PROVENTIL HFA, VENTOLIN HFA, PROAIR HFA) inhaler 2 Puff  2 Puff Inhalation Q6H PRN    arformoterol 15 mcg/budesonide 0.5 mg neb solution   Nebulization BID RT    ARIPiprazole (ABILIFY) tablet 10 mg  10 mg Oral QHS    prazosin (MINIPRESS) capsule 2 mg  2 mg Oral QHS    LORazepam (ATIVAN) tablet 1 mg  1 mg Oral Q6H PRN    naltrexone (DEPADE) tablet 50 mg  50 mg Oral DAILY    buPROPion SR (WELLBUTRIN SR) tablet 150 mg  150 mg Oral DAILY    thiamine mononitrate (B-1) tablet 100 mg  100 mg Oral DAILY    folic acid tablet 1 mg  1 mg Oral DAILY    ondansetron (ZOFRAN ODT) tablet 4 mg  4 mg Oral Q6H PRN    ziprasidone (GEODON) 10 mg in sterile water (preservative free) 0.5 mL injection  10 mg IntraMUSCular BID PRN    hydrOXYzine pamoate (VISTARIL) capsule 50 mg  50 mg Oral TID PRN    acetaminophen (TYLENOL) tablet 650 mg  650 mg Oral Q4H PRN    ibuprofen (MOTRIN) tablet 400 mg  400 mg Oral Q8H PRN    magnesium hydroxide (MILK OF MAGNESIA) 400 mg/5 mL oral suspension 30 mL  30 mL Oral DAILY PRN    nicotine (NICODERM CQ) 21 mg/24 hr patch 1 Patch  1 Patch TransDERmal DAILY PRN    alum-mag hydroxide-simeth (MYLANTA) oral suspension 30 mL  30 mL Oral Q4H PRN    mirtazapine (REMERON SOL-TAB) disintegrating tablet 15 mg  15 mg Oral QHS       VITALS:   Patient Vitals for the past 12 hrs:   Temp Pulse Resp BP SpO2   04/26/21 0754 97 °F (36.1 °C) 84 16 (!) 105/57 99 %   04/25/21 2312 (!) 96.3 °F (35.7 °C) 95 17 124/76 99 %       LABS: .  Recent Results (from the past 24 hour(s))   EKG, 12 LEAD, INITIAL    Collection Time: 04/25/21  3:49 PM   Result Value Ref Range    Ventricular Rate 76 BPM    Atrial Rate 76 BPM    P-R Interval 146 ms    QRS Duration 72 ms    Q-T Interval 368 ms    QTC Calculation (Bezet) 414 ms    Calculated P Axis 57 degrees    Calculated R Axis 59 degrees    Calculated T Axis 47 degrees    Diagnosis       Normal sinus rhythm with sinus arrhythmia  Normal ECG  No previous ECGs available         MSE:   Appearance: casually dressed; adequately groomed  Behavior: very withdrawn; no agitation  Motor: slowed  Mood/Affect: very dysphoric and restricted  Thought Process: mostly coherent and organized, but limited  Thought Content: possible paranoid delusions  Perceptions: +AH's  Insight/Judgment: fair    ASSESSMENT:   1.  Major Depression, recurrent, with psychosis (F33.3)  2. PTSD (F43.10)  3. Alcohol Use disorder (F10.20)  4. Borderline Personality disorder (F60.3)    PLAN:  1. Increase the Abilify to 10 mg QHS, likely further. 2. Start Prazosin at 2 mg QHS. 3. Continue the Wellbutrin SR and plan to increase to 300 mg/day on 4/28. 4. Continue the Remeron at 15 mg QHS and Naltrexone at 50 mg daily. 5. Continue the PRN Geodon and Vistaril. 6. ELOS: 7-8 days, given her history and severity of sx's.

## 2021-04-26 NOTE — GROUP NOTE
[facility]  GROUP DOCUMENTATION INDIVIDUAL                                                                          Group Therapy Note    Date: 4/25/2021    Group Start Time: 2000  Group End Time: 2030  Group Topic: Community Meeting    [address] [place] [address] GROUP DOCUMENTATION GROUP    Group Therapy Note    Attendees: 4         Attendance: Attended    Patient's Goal:  Get better    Interventions/techniques: Supported    Follows Directions: Followed directions    Interactions: Interacted appropriately    Mental Status: Anxious, Depressed, Flat and Hallucinations    Behavior/appearance: Cooperative and Negative    Goals Achieved: Able to engage in interactions, Able to listen to others, Able to give feedback to another    Additional Notes:  Susanna was out for the Wrap-Up Group today. She stated that she is having an okay day, she ate good and well sleep okay tonight. She stated that she is having anxiety at the level of a 6, and depression at thel level of a 9. She had a good time doing the zoom call today, but hated that she is hearing voices and the voices tell her to do things and talks back to her. She has no thoughts of hurting herself nor anyone else and is in no pain at this time.     Bettina Randolph CNA

## 2021-04-26 NOTE — BH NOTES
Susanna was out for the Group Activity tonight. We played cards and the board game LIFE.     Jonathan Ramos CNA

## 2021-04-26 NOTE — GROUP NOTE
[facility]  GROUP DOCUMENTATION INDIVIDUAL                                                                          Group Therapy Note    Date: 4/26/2021    Group Start Time: 1030  Group End Time: 1100  Group Topic: Community Meeting    [address] GROUP DOCUMENTATION GROUP    Group Therapy Note    Attendees: 5         Attendance: Attended    Patient's Goal:  To go home soon    Interventions/techniques: Promoted peer support    Follows Directions:  Followed directions    Interactions: Interacted appropriately    Mental Status: Calm    Behavior/appearance: Attentive and Cooperative    Goals Achieved: Able to engage in interactions, Able to listen to others, Able to give feedback to another and Able to reflect/comment on own behavior      Additional Notes:  Complains of back pain with a 5 out of 10 rating, anxiety level 4, denies being depressed and having suicidal ideations     Thomas Civil

## 2021-04-26 NOTE — PROGRESS NOTES
Problem: Depressed Mood (Adult/Pediatric)  Goal: *STG: Attends activities and groups  Outcome: Progressing Towards Goal  Goal: *STG: Remains safe in hospital  Outcome: Progressing Towards Goal

## 2021-04-26 NOTE — ROUTINE PROCESS
Shift change report given to Shayla Valdivia RN, re: SBAR, medications, and behavior. Lazaro Fall Risk Score- 1.

## 2021-04-26 NOTE — BH NOTES
Shift [facility] re: SBAR, medications, and behavior.   Lazaro fall risk score: (1)

## 2021-04-26 NOTE — BH NOTES
SOCIAL WORK PROGRESS NOTE    NAME:Susanna Carlos  : 1995  MRN: [id number]      Patient presentation including presence/absence SI/HI, psychosis, ability to perform ADLs: currently denies SI/HI, recent SI,She is having command auditory hallucinations, pleasant and cooperative, reports depression has been withdrawn according to the notes, she is able to do ADL care    Concerns expressed by patient: PTSD, prior attempts    Family involvement: lives with     Resource needs: will make sure she is connected with the [place], AA    Strengths:     Limitations: past hx, family hx of suicide, possible non compliance, history of drinking, AH    Other:

## 2021-04-26 NOTE — ROUTINE PROCESS
Shift change report given to Agusto Greenberg Rn, re: SBAR. Medications, and behavior.   Lazaro Fall Risk Score (1)

## 2021-04-26 NOTE — PROGRESS NOTES
Problem: Anxiety-Behavioral Health (Adult/Pediatric)  Goal: *STG: Participates in treatment plan  Outcome: Progressing Towards Goal  Goal: *STG: Remains safe in hospital  Outcome: Progressing Towards Goal  Goal: *STG: Seeks staff when feelings of anxiety and fear arise  Outcome: Progressing Towards Goal  Goal: *STG: Attends activities and groups  Outcome: Progressing Towards Goal  Goal: *STG/LTG: Complies with medication therapy  Outcome: Progressing Towards Goal     Problem: Patient Education: Go to Patient Education Activity  Goal: Patient/Family Education  Outcome: Progressing Towards Goal

## 2021-04-27 LAB
ATRIAL RATE: 76 BPM
CALCULATED P AXIS, ECG09: 57 DEGREES
CALCULATED R AXIS, ECG10: 59 DEGREES
CALCULATED T AXIS, ECG11: 47 DEGREES
DIAGNOSIS, 93000: NORMAL
P-R INTERVAL, ECG05: 146 MS
Q-T INTERVAL, ECG07: 368 MS
QRS DURATION, ECG06: 72 MS
QTC CALCULATION (BEZET), ECG08: 414 MS
VENTRICULAR RATE, ECG03: 76 BPM

## 2021-04-27 PROCEDURE — 94760 N-INVAS EAR/PLS OXIMETRY 1: CPT

## 2021-04-27 PROCEDURE — 74011250637 HC RX REV CODE- 250/637: Performed by: PSYCHIATRY & NEUROLOGY

## 2021-04-27 PROCEDURE — 65220000003 HC RM SEMIPRIVATE PSYCH

## 2021-04-27 PROCEDURE — 99231 SBSQ HOSP IP/OBS SF/LOW 25: CPT | Performed by: PSYCHIATRY & NEUROLOGY

## 2021-04-27 PROCEDURE — 74011000250 HC RX REV CODE- 250: Performed by: INTERNAL MEDICINE

## 2021-04-27 PROCEDURE — 94640 AIRWAY INHALATION TREATMENT: CPT

## 2021-04-27 RX ORDER — ARIPIPRAZOLE 5 MG/1
15 TABLET ORAL
Status: DISCONTINUED | OUTPATIENT
Start: 2021-04-27 | End: 2021-04-30 | Stop reason: HOSPADM

## 2021-04-27 RX ORDER — BUPROPION HYDROCHLORIDE 150 MG/1
150 TABLET, EXTENDED RELEASE ORAL 2 TIMES DAILY
Status: DISCONTINUED | OUTPATIENT
Start: 2021-04-28 | End: 2021-04-30 | Stop reason: HOSPADM

## 2021-04-27 RX ADMIN — THIAMINE HCL TAB 100 MG 100 MG: 100 TAB at 08:45

## 2021-04-27 RX ADMIN — FOLIC ACID TAB 400 MCG 1 MG: 400 TAB at 08:44

## 2021-04-27 RX ADMIN — ARFORMOTEROL TARTRATE: 15 SOLUTION RESPIRATORY (INHALATION) at 07:55

## 2021-04-27 RX ADMIN — MIRTAZAPINE 15 MG: 15 TABLET, ORALLY DISINTEGRATING ORAL at 21:10

## 2021-04-27 RX ADMIN — HYDROXYZINE PAMOATE 50 MG: 50 CAPSULE ORAL at 13:54

## 2021-04-27 RX ADMIN — BUPROPION HYDROCHLORIDE 150 MG: 150 TABLET, EXTENDED RELEASE ORAL at 08:45

## 2021-04-27 RX ADMIN — HYDROXYZINE PAMOATE 50 MG: 50 CAPSULE ORAL at 21:10

## 2021-04-27 RX ADMIN — PHENYTOIN 2 MG: 125 SUSPENSION ORAL at 21:10

## 2021-04-27 RX ADMIN — ARFORMOTEROL TARTRATE: 15 SOLUTION RESPIRATORY (INHALATION) at 20:24

## 2021-04-27 RX ADMIN — HYDROXYZINE PAMOATE 50 MG: 50 CAPSULE ORAL at 08:45

## 2021-04-27 RX ADMIN — ARIPIPRAZOLE 15 MG: 5 TABLET ORAL at 21:10

## 2021-04-27 RX ADMIN — NALTREXONE HYDROCHLORIDE 50 MG: 50 TABLET, FILM COATED ORAL at 08:44

## 2021-04-27 NOTE — GROUP NOTE
[facility]  GROUP DOCUMENTATION INDIVIDUAL                                                                          Group Therapy Note    Date: 4/26/2021    Group Start Time: 2000  Group End Time: 2100  Group Topic: Community Meeting    [address] GROUP DOCUMENTATION GROUP    Group Therapy Note    Attendees:5         Attendance: Attended    Patient's Goal:  Stay focus    Interventions/techniques: Supported    Follows Directions:  Followed directions    Interactions: Interacted appropriately    Mental Status: Anxious    Behavior/appearance: Attentive and Cooperative    Goals Achieved: Able to engage in interactions      Additional Notes:  Blessing Dunbar

## 2021-04-27 NOTE — BH NOTES
Affect blunted, mood anxious. Pt attended and participated in scheduled group activities. Pt was social and interacted appropriately with staff and peers. Pt denies SI/HI/AVH/Pain. Pt ate 100% of snack. Pt did become distant and upset after taking to her  on the phone. Pt sat down in her door way briefly and would not respond to staff asking her if she was alright. Pt eventually came back to the dayroom and was again interacting with staff and peers. Pt compliant with medications and requested a PRN. Pt is in no apparent distress at this time and made no delusional statements. Pt rested in her bed with her eyes closed for 8 hours.     PRN Medication Documentation    Specific patient behavior that led to need for PRN medication: anxiety  Staff interventions attempted prior to PRN being given: Pt request  PRN medication given: Vistaril 50 mg PO @ 2126  Patient response/effectiveness of PRN medication: Pt rested

## 2021-04-27 NOTE — GROUP NOTE
[facility]  GROUP DOCUMENTATION INDIVIDUAL                                                                          Group Therapy Note    Date: 4/27/2021    Group Start Time: 1400  Group End Time: 1450  Group Topic: Process Group - Inpatient    [facility] OP    [place], [address] GROUP DOCUMENTATION GROUP    Group Therapy Note    Attendees: Focus of session was on the qualities and assets that we can possess in order to have a successful recovery. We focused this session on the importance of having a secure base which means that we have our basic needs met and that we have access to and utilize help when it is necessary. We spoke about how we also have to have a sense of self and positive self esteem and we spoke about specific ways in which we have grown in self esteem through the course of this treatment. We spoke about the need and importance of supportive relationships and that we benefit greatly from having others believe in us and that we have the potential to recover. We also spoke about the importance of feeling empowered to make good decisions for ourselves and can work through problems when they arise. Attendance: Attended    Patient's Goal:       Expression of positive future orientation that includes meaningful activity             Interventions/techniques: Informed, Validated, Promoted peer support and Supported    Follows Directions: Followed directions    Interactions: Interacted appropriately    Mental Status: Calm, Congruent and Preoccupied    Behavior/appearance: Attentive, Cooperative, Neatly groomed and Negative    Goals Achieved: Able to engage in interactions, Able to give feedback to another, Able to manage/cope with feelings, Able to receive feedback, Discussed coping, Identified feelings, Identified triggers and Identified relapse prevention strategies      Additional Notes:  Susanna participated in group well with prompting.   She spoke about how she knows that she has to be opening up more and she can see the issues that she has with her . She spoke about how she has a hard time  from his issues and they \"affect me too. \"  She was able to share thoughts and feelings in group and receive feedback. She is anxious to go home but I encouraged her to stay to make sure the medications are working and she can go back ready to make positive changes in her life.       Jefferson Akhtar

## 2021-04-27 NOTE — PROGRESS NOTES
INTERVAL Hx:  Records and clinical information were reviewed. States she's feeling \"better\" today and that the [facility] are less intense and her mood is not quite as dysphoric. Has still been withdrawn and isolative, but less so this AM and she says her energy level is better now and she's hoping to go home ASAP. Says she's concerned about missing all her [address] appts this week but I tried to reassure her that we can help reschedule them soon. Tolerated the increased dose of Abilify last night. Denies any SE's with the meds except for an increased appetite, but she wants to stay with this regimen. Hasn't been volatile or agitated at all and she denies any SI either. Will increase the Abilify to 15 mg tonight and try to reschedule her [address] appts as well as speak with her  to get his input on how she's doing. Slept 8 hours last night.     MEDS:  Current Facility-Administered Medications   Medication Dose Route Frequency    ARIPiprazole (ABILIFY) tablet 15 mg  15 mg Oral QHS    [START ON 4/28/2021] buPROPion SR (WELLBUTRIN SR) tablet 150 mg  150 mg Oral BID    albuterol (PROVENTIL HFA, VENTOLIN HFA, PROAIR HFA) inhaler 2 Puff  2 Puff Inhalation Q6H PRN    arformoterol 15 mcg/budesonide 0.5 mg neb solution   Nebulization BID RT    prazosin (MINIPRESS) capsule 2 mg  2 mg Oral QHS    naltrexone (DEPADE) tablet 50 mg  50 mg Oral DAILY    thiamine mononitrate (B-1) tablet 100 mg  100 mg Oral DAILY    folic acid tablet 1 mg  1 mg Oral DAILY    ondansetron (ZOFRAN ODT) tablet 4 mg  4 mg Oral Q6H PRN    ziprasidone (GEODON) 10 mg in sterile water (preservative free) 0.5 mL injection  10 mg IntraMUSCular BID PRN    hydrOXYzine pamoate (VISTARIL) capsule 50 mg  50 mg Oral TID PRN    acetaminophen (TYLENOL) tablet 650 mg  650 mg Oral Q4H PRN    ibuprofen (MOTRIN) tablet 400 mg  400 mg Oral Q8H PRN    magnesium hydroxide (MILK OF MAGNESIA) 400 mg/5 mL oral suspension 30 mL  30 mL Oral DAILY PRN    nicotine (NICODERM CQ) 21 mg/24 hr patch 1 Patch  1 Patch TransDERmal DAILY PRN    alum-mag hydroxide-simeth (MYLANTA) oral suspension 30 mL  30 mL Oral Q4H PRN    mirtazapine (REMERON SOL-TAB) disintegrating tablet 15 mg  15 mg Oral QHS       VITALS:   Patient Vitals for the past 12 hrs:   Temp Pulse Resp BP SpO2   04/27/21 0735 97 °F (36.1 °C) 87 18 (!) 121/57 98 %       LABS: .  No results found for this or any previous visit (from the past 24 hour(s)). MSE:   Appearance: casually dressed; adequately groomed  Behavior: less withdrawn today; no agitation  Motor: still slowed  Mood/Affect: still dysphoric and restricted  Thought Process: coherent and organized  Thought Content: paranoid delusions are less intense  Perceptions: AH's are less intense  Insight/Judgment: fair    ASSESSMENT:   1.  Major Depression, recurrent, with psychosis (F33.3)  2. PTSD (F43.10)  3. Alcohol Use disorder (F10.20)  4. Borderline Personality disorder (F60.3)    PLAN:  1. Increase the Abilify to 15 mg QHS, likely further. 2. Continue the Prazosin at 2 mg QHS. 3. Increase the Wellbutrin SR to 150 mg BID tomorrow. 4. Continue the Remeron at 15 mg QHS and Naltrexone at 50 mg daily. 5. Continue the PRN Geodon and Vistaril. 6. ELOS: 2-5 days, depending on input from  and clinical course.

## 2021-04-27 NOTE — PROGRESS NOTES
Problem: Risk of Harm to Self or Others  Goal: *LTG: Denial of suicidal ideation or intent for at least 2 days  Outcome: Resolved/Met  Goal: STG: Patient will limit number of statements of suicidal ideation or intent per day  Description: Patient will limit number of statements of suicidal ideation or intent per day. Indicate number of statements/day.   Outcome: Resolved/Met     Problem: Depressed Mood (Adult/Pediatric)  Goal: *STG: Complies with medication therapy  Outcome: Resolved/Met

## 2021-04-27 NOTE — BH NOTES
PRN Medication Documentation    Specific patient behavior that led to need for PRN medication: Patient c/o being anxious. Staff interventions attempted prior to PRN being given: Assessment done. Patient pulse elevated. PRN medication given: Ativan 1 mg po at [address] for anxiety. Patient response/effectiveness of PRN medication: Positive effects.

## 2021-04-27 NOTE — GROUP NOTE
ARLETH  GROUP DOCUMENTATION INDIVIDUAL                                                                          Group Therapy Note    Date: 4/27/2021    Group Start Time: 1030  Group End Time: 1100  Group Topic: Community Meeting    [address] [facility] [address] [facility] GROUP DOCUMENTATION GROUP    Group Therapy Note    Attendees: 6         Attendance: Attended    Patient's Goal:  To finish putting the puzzle together    Interventions/techniques: Provide feedback    Follows Directions: Followed directions    Interactions: Interacted appropriately    Mental Status: Depressed    Behavior/appearance: Withdrawn/quiet    Goals Achieved: Able to listen to others      Additional Notes:  Patient's appetite is good, physical pain lower back level 7, no suicidal thoughts.     Hilary Calderón

## 2021-04-27 NOTE — BH NOTES
Shift change report given to [facility] False PassRORY CARO re: SBAR, medications, and behavior.   Lazaro fall risk score: (1)

## 2021-04-27 NOTE — PROGRESS NOTES
Problem: Risk of Harm to Self or Others  Goal: *LTG: Expression of positive future orientation that includes meaningful activity  Outcome: Progressing Towards Goal  Goal: *STG: Patient will identify 2 feelings or symptoms that might indicate a crisis is beginning to develop  Outcome: Progressing Towards Goal

## 2021-04-27 NOTE — SUICIDE SAFETY PLAN
SAFETY PLAN    A suicide Safety Plan is a document that supports someone when they are having thoughts of suicide. Warning Signs that indicate a suicidal crisis may be developing: What (situations, thoughts, feelings, body sensations, behaviors, etc.) do you experience that lets you know you are beginning to think about suicide? 1. silence  2. pacing  3. anger    Internal Coping Strategies:  What things can I do (relaxation techniques, hobbies, physical activities, etc.) to take my mind off my problems without contacting another person? 1. sing  2.write  3. Watch TV    People and social settings that provide distraction: Who can I call or where can I go to distract me? 1. Name: Leighton Marie          Phone: [phone]  2. Name: Evelyn Garza  Phone: [phone]  3. Place: [address]           4. Place: sisters    People whom I can ask for help: Who can I call when I need help - for example, friends, family, clergy, someone else? 1. Name:  Jerod Beasley               Phone: [phone]  2. Name: Stephen Burger  Phone:   3. Name: James  Phone:     Professionals or [employer] agencies I can contact during a crisis: Who can I call for help - for example, my doctor, my psychiatrist, my psychologist, a mental health provider, a suicide hotline? 1. Clinician Name: [facility]    Phone:      Clinician Pager or Emergency Contact #:    2. Clinician Name:    Phone:       Clinician Pager or Emergency Contact #:     3. Suicide Prevention Lifeline: 4-084-518-TALK (4391)    4. [address] [place] [address] Emergency Services -  for example, [facility] suicide hotline, [facility] Hotline: 425.768.2816      Emergency Services Address: [address] [facility]      Emergency Services Phone: [phone]    Making the environment safe: How can I make my environment (house/apartment/living space) safer? For example, can I remove guns, medications, and other items?   1. 2.

## 2021-04-27 NOTE — BH NOTES
Spoke with patient about discharge planning. She reports being consistent with treatment but since the move back from [place] 2 months ago she has not been consistent. She does not have her car back at this time and was having trouble with transportation. SW cancelled her appointment she had at the [facility] and will reschedule once she is ready for discharge. Spoke with patient about attending AA groups and she is interested, will provide her with a schedule in her area. [place]  who is still active .

## 2021-04-27 NOTE — BH NOTES
Affect flat/restricted, mood depressed/anxious. Patient is alert & oriented x 4. Speech clear and coherent. Patient quiet and isolative but interacts with prompting. Patient denies SI/HI but continues to have auditory hallucinations without commands. Patient remains paranoid and delusional.  Patient is complaint with medications and PRN's given. Patient appetite good eats 100% of all meals plus snacks. No agitation or aggression noted. Patient attended and engaged in groups with prompting. Patient in dayroom most of the shift watching television wearing her face mask and adhering to social distancing. Patient in no apparent distress all vital signs within normal limits. PRN Medication Documentation    Specific patient behavior that led to need for PRN medication: Patient requested PRN Vistaril for anxiety. Staff interventions attempted prior to PRN being given: Assessment done. PRN medication given: Vistaril 50 mg po @ 0845 for anxiety. Patient response/effectiveness of PRN medication: Positive effects. PRN Medication Documentation    Specific patient behavior that led to need for PRN medication: Patient requested PRN Nicotine patch to help decrease smoking cessation. Staff interventions attempted prior to PRN being given: Assessment done. PRN medication given: Nicotine patch 21 mg applied @ 0847. Patient response/effectiveness of PRN medication: Positive effects. PRN Medication Documentation    Specific patient behavior that led to need for PRN medication: Patient c/o having anxiety. Staff interventions attempted prior to PRN being given: Assessment done and patient offered PRN Vistaril. PRN medication given: Vistaril 50 mg po at 1354. Patient response/effectiveness of PRN medication: Positive effects.

## 2021-04-27 NOTE — ROUTINE PROCESS
Shift change report given to Sekou Diane Rn, re: SBAR. Medications, and behavior.   Lazaro Fall Risk Score (1)

## 2021-04-28 PROCEDURE — 94640 AIRWAY INHALATION TREATMENT: CPT

## 2021-04-28 PROCEDURE — 65220000003 HC RM SEMIPRIVATE PSYCH

## 2021-04-28 PROCEDURE — 99231 SBSQ HOSP IP/OBS SF/LOW 25: CPT | Performed by: PSYCHIATRY & NEUROLOGY

## 2021-04-28 PROCEDURE — 74011250637 HC RX REV CODE- 250/637: Performed by: PSYCHIATRY & NEUROLOGY

## 2021-04-28 PROCEDURE — 94760 N-INVAS EAR/PLS OXIMETRY 1: CPT

## 2021-04-28 PROCEDURE — 74011000250 HC RX REV CODE- 250: Performed by: INTERNAL MEDICINE

## 2021-04-28 RX ADMIN — MIRTAZAPINE 15 MG: 15 TABLET, ORALLY DISINTEGRATING ORAL at 21:23

## 2021-04-28 RX ADMIN — ARFORMOTEROL TARTRATE: 15 SOLUTION RESPIRATORY (INHALATION) at 08:45

## 2021-04-28 RX ADMIN — BUPROPION HYDROCHLORIDE 150 MG: 150 TABLET, EXTENDED RELEASE ORAL at 16:36

## 2021-04-28 RX ADMIN — FOLIC ACID TAB 400 MCG 1 MG: 400 TAB at 08:23

## 2021-04-28 RX ADMIN — ARIPIPRAZOLE 15 MG: 5 TABLET ORAL at 21:23

## 2021-04-28 RX ADMIN — IBUPROFEN 400 MG: 400 TABLET, FILM COATED ORAL at 21:24

## 2021-04-28 RX ADMIN — PHENYTOIN 2 MG: 125 SUSPENSION ORAL at 21:23

## 2021-04-28 RX ADMIN — IBUPROFEN 400 MG: 400 TABLET, FILM COATED ORAL at 08:23

## 2021-04-28 RX ADMIN — THIAMINE HCL TAB 100 MG 100 MG: 100 TAB at 08:23

## 2021-04-28 RX ADMIN — HYDROXYZINE PAMOATE 50 MG: 50 CAPSULE ORAL at 21:24

## 2021-04-28 RX ADMIN — NALTREXONE HYDROCHLORIDE 50 MG: 50 TABLET, FILM COATED ORAL at 08:23

## 2021-04-28 RX ADMIN — BUPROPION HYDROCHLORIDE 150 MG: 150 TABLET, EXTENDED RELEASE ORAL at 08:23

## 2021-04-28 RX ADMIN — ARFORMOTEROL TARTRATE: 15 SOLUTION RESPIRATORY (INHALATION) at 20:00

## 2021-04-28 NOTE — BH NOTES
Master Treatment Plan Review for Susanna Carlos    Date of Admission Date Treatment Plan Reviewed Anticipated Date of Discharge Legal Status    04/24/2021 04/28/2021  Voluntary     Treatment & Discharge Planning Changes    Modality or Intervention Changes   04/25/2021- TSH, Lipid Panel, EKG   Psychotropic Medication Changes 04/25/2021- Wellbutrin 150 mg PO daily, Abilify 5 mg PO daily, Ativan 2 mg PO PRN, Zofran 4 mg PO PRN  04/26/2021- Prazosin 2 mg PO QHS, Abilify to 10 mg PO QHS, Ativan 1 mg PO Q6H PRN, D/C Ativan 2 mg IM q6H PRN  04/27/2021- Abilify to 15 mg PO QHS, Wellbutrin  mg PO BID, D/C Ativan 1 mg Q6H PRN   Discharge Planning or Target Date Changes ELOS: 2-5 days, depending on input from    New Issues   N/A     Treatment Team Signatures    I have participated in the development of this plan of treatment and agree to its implementation.     Patient Signature       Patient Printed Name Date/Time   /Therapist Signature       /Therapist Printed Name Date/Time   RN Signature       RN Printed Name  Cherie Grijalva RN Date/Time  04/28/2021  @ 0100   Unit  Signature       Unit  Printed Name Date/Time   MD Signature       MD Printed Name Date/Time

## 2021-04-28 NOTE — PROGRESS NOTES
Problem: Falls - Risk of  Goal: *Absence of Falls  Description: Document Tyler Londono Fall Risk and appropriate interventions in the flowsheet. Outcome: Progressing Towards Goal  Note: Fall Risk Interventions:            Medication Interventions: Teach patient to arise slowly                   Problem: Patient Education: Go to Patient Education Activity  Goal: Patient/Family Education  Outcome: Progressing Towards Goal     Problem: Risk of Harm to Self or Others  Goal: Patient/Family Education  Outcome: Resolved/Met  Goal: *LTG: Expression of positive future orientation that includes meaningful activity  Outcome: Resolved/Met  Goal: *STG: Patient will express feelings of depression, suicide, or self-harm without acting out  Description: Patient will express feelings of depression, suicide, or self-harm without acting out. Comment on how this will be achieved.   Outcome: Resolved/Met     Problem: Depressed Mood (Adult/Pediatric)  Goal: *STG: Participates in treatment plan  Outcome: Progressing Towards Goal  Goal: *STG: Participates in 1:1 therapy sessions  Outcome: Resolved/Met  Goal: *STG: Verbalizes anger, guilt, and other feelings in a constructive manor  Outcome: Resolved/Met  Goal: *STG: Attends activities and groups  Outcome: Resolved/Met  Goal: *STG: Demonstrates reduction in symptoms and increase in insight into coping skills/future focused  Outcome: Resolved/Met  Goal: *STG: Remains safe in hospital  Outcome: Progressing Towards Goal  Goal: *LTG: Understands illness and can identify signs of relapse  Outcome: Progressing Towards Goal

## 2021-04-28 NOTE — GROUP NOTE
ARLETH  GROUP DOCUMENTATION INDIVIDUAL                                                                          Group Therapy Note    Date: 4/28/2021    Group Start Time: 1030  Group End Time: 1100  Group Topic: Community Meeting    [address] [facility] GROUP DOCUMENTATION GROUP    Group Therapy Note    Attendees: 4         Attendance: Attended    Patient's Goal:  To do some writing in her journal     Interventions/techniques: Promoted peer support    Follows Directions:  Followed directions    Interactions: Interacted appropriately    Mental Status: Calm    Behavior/appearance: Attentive and Cooperative    Goals Achieved: Able to engage in interactions, Able to listen to others, Able to give feedback to another, Able to reflect/comment on own behavior, Able to manage/cope with feelings, Able to receive feedback, Able to experience relief/decrease in symptoms and Able to self-disclose      Additional Notes: complains of back pain 7 out of 10 rating, denies having anxiety, depression and having suicidal ideations     Stephanie Vazquez

## 2021-04-28 NOTE — BH NOTES
Pt attended recreational group and was an active participant. Pt played che[employer]Atlas and a card game with peers. Pt was attentive and cooperative during group.

## 2021-04-28 NOTE — BH NOTES
Affect blunted, mood depressed. Pt attended and participated in scheduled group activities. Pt was social and appropriate with staff and peers. Pt denies SI/HI/AVH/Pain but stated that she wants to go home now. Pt ate 100% of snack. Pt was compliant with medications and requested a PRN. Pt is in no apparent distress at this time and made no delusional statements. Pt rested in her bed with her eyes closed for 6.5 hours.     PRN Medication Documentation    Specific patient behavior that led to need for PRN medication: anxious  Staff interventions attempted prior to PRN being given: Pt request  PRN medication given: Vistaril 50 mg PO @ 2110  Patient response/effectiveness of PRN medication: Pt rested
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
## 2021-04-28 NOTE — GROUP NOTE
ARLETH  GROUP DOCUMENTATION INDIVIDUAL                                                                          Group Therapy Note    Date: 4/27/2021    Group Start Time: 2000  Group End Time: 2030  Group Topic: Community Meeting    [address] [facility] GROUP DOCUMENTATION GROUP    Group Therapy Note    Attendees: 5         Attendance: Attended    Patient's Goal:  Go home    Interventions/techniques: Supported    Follows Directions: Followed directions    Interactions: Interacted appropriately    Mental Status: Anxious, Depressed and Labile    Behavior/appearance: Cooperative and Motivated    Goals Achieved: Able to engage in interactions, Able to listen to others and Able to give feedback to another      Additional Notes:  Susanna was out for the Wrap-Up Group meeting tonight. She stated that she had a good day, ate well, and hopes to sleep good tonight because she keeps waking up. She is having anxiety at thel level of 4 and depression at the level of a 2. [facility] only thing bad is she is having ringing in her ears. She has no thoughts of hurting herself nor anyone else and is having pain in her back of a 8. Nurse notified.     Maddie Andrews CNA
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
## 2021-04-28 NOTE — BH NOTES
Shift change report given to Susanna Fuentes RN re: SBAR, medications, and behavior.   Lazaro fall risk score 1

## 2021-04-28 NOTE — BH NOTES
Behavioral Health Interdisciplinary Rounds     Patient Name: Pallavi Torres  Age: 23 y.o. Room/Bed:  230/01  Primary Diagnosis: Major depressive disorder, recurrent episode, severe (HCC)   Admission Status: Voluntary     Readmission within 30 days: no  Power of  in place: no  Patient requires a blocked bed: no          Reason for blocked bed:     VTE Prophylaxis: Not indicated    Mobility needs/Fall risk: yes  Flu Vaccine : no   Nutritional Plan: no  Consults: no         Labs/Testing due today?: no    Sleep hours: 6.5       Participation in Care/Groups:  yes  Medication Compliant?: Yes  PRNS (last 24 hours): Antianxiety    Restraints (last 24 hours):  no     CIWA (range last 24 hours): CIWA-Ar Total: 6   COWS (range last 24 hours):      Alcohol screening (AUDIT) completed -   AUDIT Score: 22     If applicable, date SBIRT discussed in treatment team AND documented:   AUDIT Screen Score: AUDIT Score: 22      Document Brief Intervention (corresponds directly with the 5 A's, Ask, Advise, Assess, Assist, and Arrange): At- Risk Patients (Score 7-15 for women; 8-15 for men)  Discuss concern patient is drinking at unhealthy levels known to increase risk of alcohol-related health problems. Is Patient ready to commit to change? If No:   Encourage reflection   Discuss short term and long term health risks of consuming alcohol   Barriers to change   Reaffirm willingness to help / Educational materials provided  If Yes:   Set goal  Atonarp provided    Harmful use or Dependence (Score 16 or greater)   Discuss short term and long term health risks of consuming alcohol   Recommendations   Negotiate drinking goal   Recommend addiction specialist/center   Arrange follow-up appointments.     Tobacco - patient is a smoker: Have You Used Tobacco in the Past 30 Days: Yes  Illegal Drugs use: Have You Used Any Illegal Substances Over the Past 12 Months: No    24 hour chart check complete: yes     Patient goal(s) for today:to write in journal  Treatment team focus/goals: patient education  Progress note patient reports feeling much better, denies SI,paranoia, feels good about her med regime    LOS:  4  Expected LOS: 2    Financial concerns/prescription coverage:  no  Family contact: no      Family requesting physician contact today:  no  Discharge plan: home with outpatient treatment  Access to weapons : no        [facility]  Patient's preferred phone number for follow up call : [phone]    Participating treatment team members: Justyn Carrion, * (assigned SW), Itz Severino

## 2021-04-28 NOTE — BH NOTES
Affect constricted/flat, mood depressed. Alert and Oriented X 4. Cooperative and pleasant. Attended and participated in group. Interacted with staff and peers. Makes needs known. Ate all meals and snacks. Denied SI/HI/VH. AH occurrences decreases significantly. Medication compliant. Stable with no s/s of distress. PRN Medication Documentation    Specific patient behavior that led to need for PRN medication: nicotine withdrawal  Staff interventions attempted prior to PRN being given: requested  PRN medication given: nicotine patch 21mg at 0825   Patient response/effectiveness of PRN medication: tolerated    PRN Medication Documentation    Specific patient behavior that led to need for PRN medication: Nicotine withdraw/pt patch fell off in shower  Staff interventions attempted prior to PRN being given: disposed of patch in black bin.   PRN medication given: nicotine patch 21 mg at 1429  Patient response/effectiveness of PRN medication: tolerated    PRN Medication Documentation    Specific patient behavior that led to need for PRN medication: c/o of HA  Staff interventions attempted prior to PRN being given: requested  PRN medication given: motrin 400 mg po 0823  Patient response/effectiveness of PRN medication: tolerated

## 2021-04-28 NOTE — ROUTINE PROCESS
Shift change report given to Rober Hogan Rn, re: SBAR. Medications, and behavior.   Lazaro Fall Risk Score (1)

## 2021-04-28 NOTE — PROGRESS NOTES
INTERVAL Hx:  Records and clinical information were reviewed. Still says she's feeling much better, although she still presents as guarded with me, as well as emotionally flat. More animated with other patients and staff. States the [facility] and paranoia are greatly decreased and she denies any SI or urges to harm herself. She hasn't had any dissociative episodes since the weekend and she doesn't remember that it happened. No SE's with the meds and she feels good about this regimen. Slept 6.5 hours last night.     MEDS:  Current Facility-Administered Medications   Medication Dose Route Frequency    ARIPiprazole (ABILIFY) tablet 15 mg  15 mg Oral QHS    buPROPion SR (WELLBUTRIN SR) tablet 150 mg  150 mg Oral BID    albuterol (PROVENTIL HFA, VENTOLIN HFA, PROAIR HFA) inhaler 2 Puff  2 Puff Inhalation Q6H PRN    arformoterol 15 mcg/budesonide 0.5 mg neb solution   Nebulization BID RT    prazosin (MINIPRESS) capsule 2 mg  2 mg Oral QHS    naltrexone (DEPADE) tablet 50 mg  50 mg Oral DAILY    thiamine mononitrate (B-1) tablet 100 mg  100 mg Oral DAILY    folic acid tablet 1 mg  1 mg Oral DAILY    ondansetron (ZOFRAN ODT) tablet 4 mg  4 mg Oral Q6H PRN    ziprasidone (GEODON) 10 mg in sterile water (preservative free) 0.5 mL injection  10 mg IntraMUSCular BID PRN    hydrOXYzine pamoate (VISTARIL) capsule 50 mg  50 mg Oral TID PRN    acetaminophen (TYLENOL) tablet 650 mg  650 mg Oral Q4H PRN    ibuprofen (MOTRIN) tablet 400 mg  400 mg Oral Q8H PRN    magnesium hydroxide (MILK OF MAGNESIA) 400 mg/5 mL oral suspension 30 mL  30 mL Oral DAILY PRN    nicotine (NICODERM CQ) 21 mg/24 hr patch 1 Patch  1 Patch TransDERmal DAILY PRN    alum-mag hydroxide-simeth (MYLANTA) oral suspension 30 mL  30 mL Oral Q4H PRN    mirtazapine (REMERON SOL-TAB) disintegrating tablet 15 mg  15 mg Oral QHS       VITALS:   Patient Vitals for the past 12 hrs:   Temp Pulse Resp BP SpO2   04/28/21 0845 -- -- -- -- 100 %   04/28/21 0722 96.8 °F (36 °C) 88 18 120/60 99 %       LABS: .  No results found for this or any previous visit (from the past 24 hour(s)). MSE:   Appearance: casually dressed; adequately groomed  Behavior: less withdrawn today; no agitation  Motor: less slowed  Mood/Affect: still flat and restricted  Thought Process: coherent and organized  Thought Content: paranoid delusions are less intense  Perceptions: AH's are less intense  Insight/Judgment: fair    ASSESSMENT:   1.  Major Depression, recurrent, with psychosis (F33.3)  2. PTSD (F43.10)  3. Alcohol Use disorder (F10.20)  4. Borderline Personality disorder (F60.3)    PLAN:  1. Continue the Abilify at 15 mg QHS. 2. Continue the Prazosin at 2 mg QHS. 3. Increase the Wellbutrin SR to 150 mg BID today. 4. Continue the Remeron at 15 mg QHS and Naltrexone at 50 mg daily. 5. Continue the PRN Geodon and Vistaril. 6. ELOS: 2 days.

## 2021-04-28 NOTE — PROGRESS NOTES
Problem: Risk of Harm to Self or Others  Goal: *STG: Patient will identify 2 alternative ways to cope with suicidal or self-harm feelings  Outcome: Progressing Towards Goal  Goal: *STG: Patient will identify 2 support persons to contact after discharge  Outcome: Progressing Towards Goal  Goal: *STG: Patient will develop a list of support people in his life  Description: Patient will develop a list of support people in his/her life whom he/she will call when feeling to hurt self or others may return.   Outcome: Progressing Towards Goal

## 2021-04-29 PROCEDURE — 74011250637 HC RX REV CODE- 250/637: Performed by: PSYCHIATRY & NEUROLOGY

## 2021-04-29 PROCEDURE — 74011000250 HC RX REV CODE- 250: Performed by: INTERNAL MEDICINE

## 2021-04-29 PROCEDURE — 99231 SBSQ HOSP IP/OBS SF/LOW 25: CPT | Performed by: PSYCHIATRY & NEUROLOGY

## 2021-04-29 PROCEDURE — 94640 AIRWAY INHALATION TREATMENT: CPT

## 2021-04-29 PROCEDURE — 65220000003 HC RM SEMIPRIVATE PSYCH

## 2021-04-29 RX ADMIN — NALTREXONE HYDROCHLORIDE 50 MG: 50 TABLET, FILM COATED ORAL at 09:03

## 2021-04-29 RX ADMIN — MIRTAZAPINE 15 MG: 15 TABLET, ORALLY DISINTEGRATING ORAL at 21:38

## 2021-04-29 RX ADMIN — HYDROXYZINE PAMOATE 50 MG: 50 CAPSULE ORAL at 09:04

## 2021-04-29 RX ADMIN — ARFORMOTEROL TARTRATE: 15 SOLUTION RESPIRATORY (INHALATION) at 08:56

## 2021-04-29 RX ADMIN — PHENYTOIN 2 MG: 125 SUSPENSION ORAL at 21:38

## 2021-04-29 RX ADMIN — BUPROPION HYDROCHLORIDE 150 MG: 150 TABLET, EXTENDED RELEASE ORAL at 09:04

## 2021-04-29 RX ADMIN — ARIPIPRAZOLE 15 MG: 5 TABLET ORAL at 21:38

## 2021-04-29 RX ADMIN — BUPROPION HYDROCHLORIDE 150 MG: 150 TABLET, EXTENDED RELEASE ORAL at 16:09

## 2021-04-29 RX ADMIN — ARFORMOTEROL TARTRATE: 15 SOLUTION RESPIRATORY (INHALATION) at 20:14

## 2021-04-29 NOTE — GROUP NOTE
IP  GROUP DOCUMENTATION INDIVIDUAL                                                                          Group Therapy Note    Date: 4/29/2021    Group Start Time: 1300  Group End Time: 1400  Group Topic: Nursing    Mark Chacko26, RN    IP [facility] GROUP DOCUMENTATION GROUP    Group Therapy Note    Attendees: 1         Attendance: Attended    Patient's Goal:  Patient will attend and engage in group therapy daily. Interventions/techniques: Provide feedback and Reinforced    Follows Directions: Followed directions    Interactions: Interacted appropriately    Mental Status: Flat and Restricted    Behavior/appearance: Cooperative, Needed prompting and Withdrawn/quiet    Goals Achieved: Able to engage in interactions, Able to receive feedback, Discussed coping, Discussed discharge plans, Discussed safety plan, Identified triggers and Identified relapse prevention strategies      Additional Notes: Patient had a 1:1 group with RN on \"Life Stressors. \"   Patient very soft spoken but explained why she drink alcohol heavy daily. Patient said that she her rough childhood and the  play a big part in her life trying to deal with PTSD and alcohol help her to cope. Patient provide education on Substance abuse.     Cari Harada, RN

## 2021-04-29 NOTE — BH NOTES
Shift change report given to Angelica Chaves re: SBAR, medications, and behavior.   Lazaro fall risk score: (1)

## 2021-04-29 NOTE — PROGRESS NOTES
Problem: Risk of Harm to Self or Others  Goal: *STG: Patient will identify 2 feelings or symptoms that might indicate a crisis is beginning to develop  Outcome: Resolved/Met  Goal: *STG: Patient will identify 2 alternative ways to cope with suicidal or self-harm feelings  Outcome: Resolved/Met  Goal: *STG: Patient will identify 2 support persons to contact after discharge  Outcome: Resolved/Met  Goal: *STG: Patient will develop a list of support people in his life  Description: Patient will develop a list of support people in his/her life whom he/she will call when feeling to hurt self or others may return. Outcome: Resolved/Met  Goal: *STG: Patient will identify 3 strengths/likes about themselves by 3rd day of treatment  Description: Patient will identify 3 strengths/likes about themselves by 3rd day of treatment.   Outcome: Resolved/Met  Goal: Risk of harm to self or others interventions  Outcome: Resolved/Met

## 2021-04-29 NOTE — GROUP NOTE
[facility]  GROUP DOCUMENTATION INDIVIDUAL                                                                          Group Therapy Note    Date: 4/29/2021    Group Start Time: 1400  Group End Time: 2962  Group Topic: Process Group - Inpatient    [address], [address] [facility] GROUP DOCUMENTATION GROUP    Group Therapy Note    Attendees: Focus of session was on article by Dr Darrel Vernon titled Covid Transition Anxiety.   I shared information from the article that specifically talks about how there will be struggles transitioning back to normal behaviors after being limited for over a year. I shared that this can apply as well to similar patterns of avoidance and isolation due to anxiety and self esteem issues and can be very helpful in breaking out of old patterns. We spoke about what specific issues or patterns group members want to break out of that is creating fear and anxiety and how these strategies can help. Attendance: Attended    Patient's Goal:       Demonstrates effective anxiety reduction strategies             Interventions/techniques: Informed, Validated, Promoted peer support and Supported    Follows Directions: Followed directions    Interactions: Interacted appropriately    Mental Status: Anxious, Congruent and Preoccupied    Behavior/appearance: Attentive, Cooperative, Negative and Poor eye contact    Goals Achieved: Able to engage in interactions, Able to listen to others, Able to give feedback to another, Able to receive feedback, Discussed coping, Discussed discharge plans, Identified feelings, Identified triggers and Identified relapse prevention strategies      Additional Notes:  Susanna participated in group with prompting. She spoke about how she knows that she needs to find a way to manage her thoughts as she feels overwhelmed with thoughts about what can happen. \"I just don't know how I can live this life. \"  She spoke about how she can relate to just staying confided where it is safe. She spoke about how she will be willing to work with her OP therapist at the [place] to start to build up her recovery and her self confidence.       Jefferson Akhtar

## 2021-04-29 NOTE — PROGRESS NOTES
INTERVAL Hx:  Records and clinical information were reviewed. Still reports feeling much better overall, and that she's not nearly so dysphoric or emotionally distressed. Realizes now that she had dissociated several times shortly after admission and that her emotional state was quite severely unstable. We discussed the nature of dissociation as well as some coping strategies to deal with it as well as the depression and PTSD issues. States the [facility] and paranoia are almost gone and she denies any SI or urges to harm herself. No SE's with the meds and she feels good about this regimen. Slept 6.75 hours last night. Discussed D/C and follow up plans.     MEDS:  Current Facility-Administered Medications   Medication Dose Route Frequency    ARIPiprazole (ABILIFY) tablet 15 mg  15 mg Oral QHS    buPROPion SR (WELLBUTRIN SR) tablet 150 mg  150 mg Oral BID    albuterol (PROVENTIL HFA, VENTOLIN HFA, PROAIR HFA) inhaler 2 Puff  2 Puff Inhalation Q6H PRN    arformoterol 15 mcg/budesonide 0.5 mg neb solution   Nebulization BID RT    prazosin (MINIPRESS) capsule 2 mg  2 mg Oral QHS    naltrexone (DEPADE) tablet 50 mg  50 mg Oral DAILY    ondansetron (ZOFRAN ODT) tablet 4 mg  4 mg Oral Q6H PRN    ziprasidone (GEODON) 10 mg in sterile water (preservative free) 0.5 mL injection  10 mg IntraMUSCular BID PRN    hydrOXYzine pamoate (VISTARIL) capsule 50 mg  50 mg Oral TID PRN    acetaminophen (TYLENOL) tablet 650 mg  650 mg Oral Q4H PRN    ibuprofen (MOTRIN) tablet 400 mg  400 mg Oral Q8H PRN    magnesium hydroxide (MILK OF MAGNESIA) 400 mg/5 mL oral suspension 30 mL  30 mL Oral DAILY PRN    nicotine (NICODERM CQ) 21 mg/24 hr patch 1 Patch  1 Patch TransDERmal DAILY PRN    alum-mag hydroxide-simeth (MYLANTA) oral suspension 30 mL  30 mL Oral Q4H PRN    mirtazapine (REMERON SOL-TAB) disintegrating tablet 15 mg  15 mg Oral QHS       VITALS:   Patient Vitals for the past 12 hrs:   Temp Pulse Resp BP SpO2   04/29/21 0722 (!) 96.4 °F (35.8 °C) 82 17 (!) 107/54 98 %       LABS: .  No results found for this or any previous visit (from the past 24 hour(s)). MSE:   Appearance: casually dressed; adequately groomed  Behavior: less withdrawn today; no agitation  Motor: less slowed  Mood/Affect: still flat and restricted  Thought Process: coherent and organized  Thought Content: paranoid delusions are less intense  Perceptions: AH's are less intense  Insight/Judgment: fair    ASSESSMENT:   1.  Major Depression, recurrent, with psychosis (F33.3)  2. PTSD (F43.10)  3. Alcohol Use disorder (F10.20)  4. Borderline Personality disorder (F60.3)    PLAN:  1. Continue the Abilify at 15 mg QHS. 2. Continue the Prazosin at 2 mg QHS. 3. Continue the Wellbutrin SR at 150 mg BID today. 4. Continue the Remeron at 15 mg QHS and Naltrexone at 50 mg daily. 5. Continue the PRN Vistaril. 6. Plan for D/C tomorrow if stable.

## 2021-04-29 NOTE — BH NOTES
SOCIAL WORK PROGRESS NOTE    NAME:Susanna Carlos  : 1995  MRN: [id number]      Patient presentation including presence/absence SI/HI, psychosis, ability to perform ADLs: she is able to do ADL care, she participates in the activities, appropriate with peers and staff, denies SI/HI    Concerns expressed by patient: no new concerns    Family involvement:  is involved    Resource needs: outpatient follow up appointments, AA information    Strengths: compliant, educated, insightful, able to care for herself    Limitations:     Other: patient will be discharged tomorrow to home with , she will follow up with the [place] for med management and therapy, she will be given a copy of AA meetings in her community

## 2021-04-29 NOTE — BH NOTES
Affect flat/restricted, mood depressed/anxious. Patient is alert & oriented x 4. Patient soft spoken but speech clear and coherent. Patient quiet and isolative but interacts with prompting. Patient denies SI/HI/VH but continues to have some auditory hallucinations without commands. Patient still having positive paranoid delusions but are less intense. Patient is compliant with medications and PRN's given. Patient appetite good eats 100% of all meals plus snacks. Patient attended and engaged in groups with prompting. No agitation or aggression noted. Patient in no apparent distress all vital signs within normal limits. Patient will be discharged home tomorrow if continues to be stable. PRN Medication Documentation    Specific patient behavior that led to need for PRN medication: Patient requested PRN Vistaril for anxiety. Staff interventions attempted prior to PRN being given: Assessment done. PRN medication given: Vistaril 50 mg po @ 1357 for anxiety. Patient response/effectiveness of PRN medication: Positive effects. PRN Medication Documentation    Specific patient behavior that led to need for PRN medication: Patient requested a Nicotine patch to help decrease smoking cessation. Staff interventions attempted prior to PRN being given: Assessment done. Patient also provided information on \"Quitting Smoking. \"  PRN medication given:Nicotine patch 21 mg applied @ 3058 to help decrease smoking carvings. Patient response/effectiveness of PRN medication: Positive effects.

## 2021-04-29 NOTE — BH NOTES
Affect blunted, mood depressed. Pt attended and participated in scheduled group activities. Pt was social and appropriate with staff and peers. Pt denies SI/HI/AVH/Pain. Pt did state that her back was mildly stiff. Pt ate 100% of snack. Pt compliant with medications and requested PRN's. Pt is in no apparent distress at this time and made no delusional statements. Pt rested in her bed with her eyes closed for 6.75 hours.     PRN Medication Documentation    Specific patient behavior that led to need for PRN medication: anxious  Staff interventions attempted prior to PRN being given: Pt request  PRN medication given: Vistaril 50 mg PO @ 2124  Patient response/effectiveness of PRN medication: Pt rested    PRN Medication Documentation    Specific patient behavior that led to need for PRN medication: back stiffness  Staff interventions attempted prior to PRN being given: Pt request  PRN medication given: Motrin 400 mg PO @ 2124  Patient response/effectiveness of PRN medication: Pt rested

## 2021-04-29 NOTE — GROUP NOTE
[facility]  GROUP DOCUMENTATION INDIVIDUAL                                                                          Group Therapy Note    Date: 4/28/2021    Group Start Time: 0800  Group End Time: 0830  Group Topic: Community Meeting    [address] [facility] [address] [facility] GROUP DOCUMENTATION GROUP    Group Therapy Note    Attendees: 2         Attendance: Attended    Patient's Goal:  To write more    Interventions/techniques: Promoted peer support and Provide feedback    Follows Directions: Followed directions    Interactions: Interacted appropriately    Mental Status: Calm    Behavior/appearance: Cooperative and Motivated    Goals Achieved: Able to engage in interactions, Able to listen to others and Able to give feedback to another      Additional Notes:  Pt. States her back pain is at a (7). Pt. States no anxiety or depression noted. She has no thoughts of self harm or harm to others.      Scott Mini

## 2021-04-30 VITALS
TEMPERATURE: 96 F | BODY MASS INDEX: 27.31 KG/M2 | HEART RATE: 79 BPM | DIASTOLIC BLOOD PRESSURE: 65 MMHG | SYSTOLIC BLOOD PRESSURE: 103 MMHG | RESPIRATION RATE: 16 BRPM | WEIGHT: 160 LBS | HEIGHT: 64 IN | OXYGEN SATURATION: 100 %

## 2021-04-30 PROBLEM — F33.3 SEVERE EPISODE OF RECURRENT MAJOR DEPRESSIVE DISORDER, WITH PSYCHOTIC FEATURES (HCC): Status: ACTIVE | Noted: 2021-04-24

## 2021-04-30 PROCEDURE — 74011250637 HC RX REV CODE- 250/637: Performed by: PSYCHIATRY & NEUROLOGY

## 2021-04-30 PROCEDURE — 99239 HOSP IP/OBS DSCHRG MGMT >30: CPT | Performed by: PSYCHIATRY & NEUROLOGY

## 2021-04-30 PROCEDURE — 94640 AIRWAY INHALATION TREATMENT: CPT

## 2021-04-30 PROCEDURE — 74011000250 HC RX REV CODE- 250: Performed by: INTERNAL MEDICINE

## 2021-04-30 RX ORDER — IBUPROFEN 200 MG
1 TABLET ORAL
Qty: 30 PATCH | Refills: 0 | Status: SHIPPED | OUTPATIENT
Start: 2021-04-30 | End: 2021-05-30

## 2021-04-30 RX ORDER — NALTREXONE HYDROCHLORIDE 50 MG/1
50 TABLET, FILM COATED ORAL DAILY
Qty: 30 TAB | Refills: 0 | Status: SHIPPED | OUTPATIENT
Start: 2021-05-01

## 2021-04-30 RX ORDER — PRAZOSIN HYDROCHLORIDE 2 MG/1
2 CAPSULE ORAL
Qty: 30 CAP | Refills: 0 | Status: SHIPPED | OUTPATIENT
Start: 2021-04-30

## 2021-04-30 RX ORDER — ARIPIPRAZOLE 15 MG/1
15 TABLET ORAL
Qty: 30 TAB | Refills: 0 | Status: SHIPPED | OUTPATIENT
Start: 2021-04-30

## 2021-04-30 RX ORDER — MIRTAZAPINE 15 MG/1
15 TABLET, FILM COATED ORAL
Qty: 30 TAB | Refills: 0 | Status: SHIPPED | OUTPATIENT
Start: 2021-04-30

## 2021-04-30 RX ORDER — HYDROXYZINE PAMOATE 50 MG/1
50 CAPSULE ORAL
Qty: 60 CAP | Refills: 0 | Status: SHIPPED | OUTPATIENT
Start: 2021-04-30

## 2021-04-30 RX ORDER — BUPROPION HYDROCHLORIDE 300 MG/1
300 TABLET ORAL
Qty: 30 TAB | Refills: 0 | Status: SHIPPED | OUTPATIENT
Start: 2021-04-30

## 2021-04-30 RX ADMIN — NALTREXONE HYDROCHLORIDE 50 MG: 50 TABLET, FILM COATED ORAL at 08:47

## 2021-04-30 RX ADMIN — ARFORMOTEROL TARTRATE: 15 SOLUTION RESPIRATORY (INHALATION) at 09:43

## 2021-04-30 RX ADMIN — BUPROPION HYDROCHLORIDE 150 MG: 150 TABLET, EXTENDED RELEASE ORAL at 08:47

## 2021-04-30 NOTE — BH NOTES
Affect constrictive, mood anxious. Alert and oriented x 4. Calm and cooperative. Appropriately dressed, makes eye contact. Interacts with staff and peers. Attended and participated in group. Ate 100% of snack. Medication compliant. Makes needs known. Denies SI/HI/AVH and pain Patient stated currently not experiencing AH. Patient stated, \"I'm excited to be discharged so I can finish my degree. \" No delusional statements made. Patient stable with no s/s of distress.   Patient laid in bed with eyes closed for 7 hours and 30min

## 2021-04-30 NOTE — BH NOTES
Affect blunted ; mood anxious/depressed. Speech rapid. Denies SE's of meds. Ate 100 % of meals. All belongings returned to patient. Verbalized understanding of DC instructions. Denies SI/HI/AVH/pain. Med compliant. DC with friend at [phone].

## 2021-04-30 NOTE — BH NOTES
Shift change report given to Qian Yang re: SBAR, medications, and behavior.   Lazaro fall risk score 1

## 2021-04-30 NOTE — GROUP NOTE
[facility]  GROUP DOCUMENTATION INDIVIDUAL                                                                          Group Therapy Note    Date: 4/30/2021    Group Start Time: 1030  Group End Time: 1100  Group Topic: Community Meeting    [address] [facility] [address] [facility] GROUP DOCUMENTATION GROUP    Group Therapy Note    Attendees: 2         Attendance: Attended    Patient's Goal:  To go home and eat steak    Interventions/techniques: Provide feedback    Follows Directions: Followed directions    Interactions: Interacted appropriately    Mental Status: Calm    Behavior/appearance: Cooperative    Goals Achieved: Able to listen to others      Additional Notes:  Patient stated that her appetite was good, physical pain lower back level 6, no suicidal thought. Patient has no depression.  Patient does have anxiety level 2    Doc Ciro

## 2021-04-30 NOTE — DISCHARGE INSTRUCTIONS
Patient Education        9 Ways to Cut Back on Drinking  Maybe you've found yourself drinking more alcohol than you'd prefer. If you want to cut back, here are some ideas to try. Think before you drink. Do you really want a drink, or is it just a habit? If you're used to having a drink at a certain time, try doing something else then. Look for substitutes. Find some no-alcohol drinks that you enjoy, like flavored seltzer water, tea with honey, or tonic with a slice of lime. Or try alcohol-free beer or \"virgin\" cocktails (without the alcohol). Drink more water. Use water to quench your thirst. Drink a glass of water before you have any alcohol. Have another glass along with every drink or between drinks. Shrink your drink. For example, have a bottle of beer instead of a pint. Use a smaller glass for wine. Choose drinks with lower alcohol content (ABV%). Or use less liquor and more mixer in cocktails. Slow down. It's easy to drink quickly and without thinking about it. Pay attention, and make each drink last longer. Do the math. Total up how much you spend on alcohol each month. How much is that a year? If you cut back, what could you do with the money you save? Take a break. Choose a day or two each week when you won't drink at all. Notice how you feel on those days, physically and emotionally. How did you sleep? Do you feel better? Over time, add more break days. Count calories. Would you like to lose some weight? That can be a good motivator for cutting back. Figure out how many calories are in each drink. How many does that add up to in a day? In a week? In a month? Practice saying no. Be ready when someone offers you a drink. Try: Curry Hussein, I've had enough. \" Or \"Thanks, but I'm cutting back. \" Or \"No, thanks. I feel better when I drink less. \"   Current as of: December 1, 2020               Content Version: 12.8  © 8771-1625 Healthwise, [facility].    Care instructions adapted under license by [employer] (which disclaims liability or warranty for this information). If you have questions about a medical condition or this instruction, always ask your healthcare professional. [address] any warranty or liability for your use of this information. BEHAVIORAL HEALTH NURSING DISCHARGE NOTE      The following personal items collected during your admission are returned to you:   Dental Appliance:    Vision: Visual Aid: Glasses, With patient  Hearing Aid:    [place] [facility]: [facility]: Body Piercing, Bracelet, Ring, Watch  Clothing: Clothing: Footwear, Jacket/Coat, Pants, Shirt, Undergarments  Other Valuables: Other Valuables: Cell Phone, Eyeglasses, Money (comment), Wallet(7.00 cash & 3 - 5 EURO)  Valuables sent to safe: Personal Items Sent to Safe: cellphone,wallet,wristwatch, id,indiana id,visa debiit 6,money 7.00 5 EURO (3)      PATIENT INSTRUCTIONS:    What to do at Home:    Recommended diet: Reg. Recommended activity: As Tolerated    Follow-up with Slime Bishop on 5/6 at 3 pm. If you have problems relating to your recovery, call your physician. The discharge information has been reviewed with the patient. The patient verbalized understanding.

## 2021-04-30 NOTE — PROGRESS NOTES
Problem: Falls - Risk of  Goal: *Absence of Falls  Description: Document [place] Fall Risk and appropriate interventions in the flowsheet.   Outcome: Progressing Towards Goal  Note: Fall Risk Interventions:            Medication Interventions: Teach patient to arise slowly                   Problem: Patient Education: Go to Patient Education Activity  Goal: Patient/Family Education  Outcome: Resolved/Met     Problem: Risk of Harm to Self or Others  Goal: *STG: Patient will agree to attend scheduled follow-up therapy and support programs after discharge  Outcome: Resolved/Met     Problem: Depressed Mood (Adult/Pediatric)  Goal: *STG: Participates in treatment plan  Outcome: Resolved/Met  Goal: *STG: Remains safe in hospital  Outcome: Resolved/Met  Goal: *LTG: Returns to previous level of functioning and participates with after care plan  Outcome: Progressing Towards Goal  Goal: *LTG: Understands illness and can identify signs of relapse  Outcome: Resolved/Met     Problem: Patient Education: Go to Patient Education Activity  Goal: Patient/Family Education  Outcome: Resolved/Met     Problem: Anxiety-Behavioral Health (Adult/Pediatric)  Goal: *STG: Remains safe in hospital  Outcome: Resolved/Met  Goal: *STG/LTG: Complies with medication therapy  Outcome: Resolved/Met     Problem: Psychosis  Goal: *STG: Remains safe in hospital  Outcome: Resolved/Met  Goal: *STG: Participates in individual and group therapy  Outcome: Resolved/Met  Goal: *STG/LTG: Complies with medication therapy  Outcome: Resolved/Met  Goal: *STG/LTG: Demonstrates improved thought patterns as evidenced by logical and coherent speech  Outcome: Resolved/Met  Goal: *STG/LTG: Demonstrates improved social functioning by responding appropriately to staff  Outcome: Resolved/Met  Goal: Interventions  Outcome: Resolved/Met     Problem: *Psychosis: Discharge Outcome  Goal: *Absent or Controlled Active Psychotic Symptoms  Outcome: Resolved/Met

## 2021-04-30 NOTE — GROUP NOTE
[facility]  GROUP DOCUMENTATION INDIVIDUAL                                                                          Group Therapy Note    Date: 4/29/2021    Group Start Time: 2000  Group End Time: 2030  Group Topic: Community Meeting    [facility] GROUP DOCUMENTATION GROUP    Group Therapy Note    Attendees: 2         Attendance: Attended    Patient's Goal:  Going home    Interventions/techniques: Supported    Follows Directions: Followed directions    Interactions: Interacted appropriately    Mental Status: Calm and Happy    Behavior/appearance: Cooperative and Motivated    Goals Achieved: Able to engage in interactions, Able to listen to others and Able to give feedback to another      Additional Notes:  Susanna was out for the Wrap-[employer] Group tonight. She stated that she is having a good day, ate good, and hopes she can sleep tonight because she is leaving tomorrow and excited about leaving. She is having no anxiety nor depression at this time. She was happy that she got to sit outside for awhile today with staff, and nothing bad happened today. She has no thoughts of hurting herself nor anyone else and is in no pain at this time.     Kaushal Johnston CNA

## 2021-04-30 NOTE — PROGRESS NOTES
Problem: Falls - Risk of  Goal: *Absence of Falls  Description: Document Burrell [place] Fall Risk and appropriate interventions in the flowsheet. Outcome: Resolved/Met     Problem: Risk of Harm to Self or Others  Goal: *LTG: Develop a discharge plan  Description: Develop a discharge plan that includes a support system and ongoing outpatient therapy.   Outcome: Resolved/Met     Problem: Depressed Mood (Adult/Pediatric)  Goal: *LTG: Returns to previous level of functioning and participates with after care plan  Outcome: Resolved/Met  Goal: Interventions  Outcome: Resolved/Met     Problem: Anxiety-Behavioral Health (Adult/Pediatric)  Goal: *STG: Participates in treatment plan  Outcome: Resolved/Met  Goal: *STG: Demonstrates effective anxiety reduction strategies  Outcome: Resolved/Met  Goal: *LTG:  Verbalizes understanding of personal adaptive level of functioning  Outcome: Resolved/Met  Goal: Interventions  Outcome: Resolved/Met     Problem: Patient Education: Go to Patient Education Activity  Goal: Patient/Family Education  Outcome: Resolved/Met     Problem: Psychosis  Goal: *STG: Decreased hallucinations  Outcome: Resolved/Met  Goal: *STG: Decreased delusional thinking  Outcome: Resolved/Met  Goal: *STG: Develop safety plan for times when triggered in stressful situations  Outcome: Resolved/Met  Goal: *STG: Patient will verbalize issues that get in their way of progress (i.e.: anger, fear etc.)  Outcome: Resolved/Met

## 2021-04-30 NOTE — BH NOTES
Patient will be discharged to home this date with her friend providing transportation. She will follow up with the [facility] [place] and will see Keegan Reddy on May 6 at 3pm. She will then be given her therapy appointment and any other appointments for services she may need. [address] [place] [address] of [facility] was routed to the Psychiatry department at the [place]. She was involved in her discharge plan.

## 2021-04-30 NOTE — BH NOTES
Behavioral Health Transition Record to Provider    Patient Name: Norm Dumont  YOB: 1995  Medical Record Number: [id number]  Date of Admission: 4/24/2021  Date of Discharge: 4/30/2021    Attending Provider: Vesta Bolanos MD  Discharging Provider: Poonam Monique  To contact this individual call [phone] and ask the  to page. If unavailable, ask to be transferred to [address] [place] [address] Provider on call. [facility] Provider will be available on call 24/7 and during holidays. Primary Care Provider: None    No Known Allergies    Reason for Admission: SI    Admission Diagnosis: Major depression [F32.9]    * No surgery found *    Results for orders placed or performed during the hospital encounter of 04/24/21   COVID-19 WITH INFLUENZA A/B   Result Value Ref Range    SARS-CoV-2 Not detected NOTD      Influenza A by PCR Not detected NOTD      Influenza B by PCR Not detected NOTD     TSH 3RD GENERATION   Result Value Ref Range    TSH 0.30 (L) 0.36 - 3.74 uIU/mL   LIPID PANEL   Result Value Ref Range    LIPID PROFILE          Cholesterol, total 172 <200 MG/DL    Triglyceride 92 <150 MG/DL    HDL Cholesterol 46 MG/DL    LDL, calculated 107.6 (H) 0 - 100 MG/DL    VLDL, calculated 18.4 MG/DL    CHOL/HDL Ratio 3.7 0.0 - 5.0     EKG, 12 LEAD, INITIAL   Result Value Ref Range    Ventricular Rate 76 BPM    Atrial Rate 76 BPM    P-R Interval 146 ms    QRS Duration 72 ms    Q-T Interval 368 ms    QTC Calculation (Bezet) 414 ms    Calculated P Axis 57 degrees    Calculated R Axis 59 degrees    Calculated T Axis 47 degrees    Diagnosis       Normal sinus rhythm with sinus arrhythmia  Normal ECG  No previous ECGs available  Confirmed by Gem Foster ([phone]) on 4/27/2021 8:27:36 AM         Immunizations administered during this encounter: There is no immunization history on file for this patient.     Screening for Metabolic Disorders for Patients on Antipsychotic Medications  (Data obtained from the EMR)    Estimated Body Mass Index  Estimated body mass index is 27.46 kg/m² as calculated from the following:    Height as of this encounter: 5' 4\" (1.626 m). Weight as of this encounter: 72.6 kg (160 lb). Vital Signs/Blood Pressure  Visit Vitals  /65 (BP 1 Location: Left arm, BP Patient Position: Lying)   Pulse 79   Temp (!) 96 °F (35.6 °C)   Resp 16   Ht 5' 4\" (1.626 m)   Wt 72.6 kg (160 lb)   SpO2 100%   Breastfeeding No   BMI 27.46 kg/m²       Blood Glucose/Hemoglobin A1c  Lab Results   Component Value Date/Time    Glucose 102 (H) 04/24/2021 06:55 AM       No results found for: HBA1C, HGBE8, LUA1VCMZ     Lipid Panel  Lab Results   Component Value Date/Time    Cholesterol, total 172 04/25/2021 07:28 AM    HDL Cholesterol 46 04/25/2021 07:28 AM    LDL, calculated 107.6 (H) 04/25/2021 07:28 AM    Triglyceride 92 04/25/2021 07:28 AM    CHOL/HDL Ratio 3.7 04/25/2021 07:28 AM        Discharge Diagnosis: Major Depression, recurrent, with psychosis    Discharge Plan: Please keep all scheduled follow up appointments. If you are unable to keep your appointment with your physician or therapist, please call them at least 24 hours in advance, if possible, so another appointment can be scheduled. It is important that you maintain contact with your physician(s) after discharge. Discharge Medication List and Instructions:   Current Discharge Medication List      START taking these medications    Details   ARIPiprazole (ABILIFY) 15 mg tablet Take 1 Tab by mouth nightly. Qty: 30 Tab, Refills: 0      buPROPion XL (WELLBUTRIN XL) 300 mg XL tablet Take 1 Tab by mouth every morning. Qty: 30 Tab, Refills: 0      hydrOXYzine pamoate (VISTARIL) 50 mg capsule Take 1 Cap by mouth two (2) times daily as needed for Anxiety. Qty: 60 Cap, Refills: 0      mirtazapine (REMERON) 15 mg tablet Take 1 Tab by mouth nightly. Qty: 30 Tab, Refills: 0      naltrexone (DEPADE) 50 mg tablet Take 1 Tab by mouth daily.   Qty: 30 Tab, Refills: 0      nicotine (NICODERM CQ) 21 mg/24 hr 1 Patch by TransDERmal route daily as needed (nicotine replacement) for up to 30 days. Qty: 30 Patch, Refills: 0      prazosin (MINIPRESS) 2 mg capsule Take 1 Cap by mouth nightly. Qty: 30 Cap, Refills: 0         CONTINUE these medications which have NOT CHANGED    Details   albuterol (PROVENTIL HFA, VENTOLIN HFA, PROAIR HFA) 90 mcg/actuation inhaler Take 2 Puffs by inhalation every six (6) hours as needed for Wheezing. fluticasone propion-salmeteroL (Advair Diskus) 250-50 mcg/dose diskus inhaler Take 1 Puff by inhalation two (2) times a day. Unresulted Labs (24h ago, onward)    None        To obtain results of studies pending at discharge, please contact     Follow-up Information     Follow up With Specialties Details Why Contact Info    Thersa Kehr NP  On 5/6/2021 at 3pm, For medication management will be given a therapy appointment at that intake [address]  [phone]    None    None (819) Patient stated that they have no PCP            Advanced Directive:   Does the patient have an appointed surrogate decision maker? No  Does the patient have a Medical Advance Directive? No  Does the patient have a Psychiatric Advance Directive? No  If the patient does not have a surrogate or Medical Advance Directive AND Psychiatric Advance Directive, the patient was offered information on these advance directives Yes and Patient will complete at a later time    Patient Instructions: Please continue all medications until otherwise directed by physician. Tobacco Cessation Discharge Plan:   Is the patient a smoker and needs referral for smoking cessation? Yes  Patient referred to the following for smoking cessation with an appointment? Yes     Patient was offered medication to assist with smoking cessation at discharge?  Yes  Was education for smoking cessation added to the discharge instructions? Yes    Alcohol/Substance Abuse Discharge Plan:   Does the patient have a history of substance/alcohol abuse and requires a referral for treatment? Yes  Patient referred to the following for substance/alcohol abuse treatment with an appointment? Yes  Patient was offered medication to assist with alcohol cessation at discharge? Yes  Was education for substance/alcohol abuse added to discharge instructions? Yes    Patient discharged to Home; discussed with patient/caregiver and provided to the patient/caregiver either in hard copy or electronically.
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
## 2021-05-24 ENCOUNTER — HOSPITAL ENCOUNTER (EMERGENCY)
Age: 26
Discharge: HOME | End: 2021-05-24
Attending: EMERGENCY MEDICINE
Payer: OTHER GOVERNMENT

## 2021-05-24 VITALS
HEIGHT: 64 IN | HEART RATE: 78 BPM | SYSTOLIC BLOOD PRESSURE: 113 MMHG | TEMPERATURE: 97.9 F | OXYGEN SATURATION: 100 % | WEIGHT: 160 LBS | RESPIRATION RATE: 18 BRPM | BODY MASS INDEX: 27.31 KG/M2 | DIASTOLIC BLOOD PRESSURE: 66 MMHG

## 2021-05-24 DIAGNOSIS — R11.2 NON-INTRACTABLE VOMITING WITH NAUSEA, UNSPECIFIED VOMITING TYPE: Primary | ICD-10-CM

## 2021-05-24 LAB
ALBUMIN SERPL-MCNC: 4 G/DL (ref 3.4–5)
ALBUMIN/GLOB SERPL: 1.2 {RATIO} (ref 0.8–1.7)
ALP SERPL-CCNC: 51 U/L (ref 45–117)
ALT SERPL-CCNC: 28 U/L (ref 13–56)
ANION GAP SERPL CALC-SCNC: 8 MMOL/L (ref 3–18)
APPEARANCE UR: CLEAR
AST SERPL-CCNC: 28 U/L (ref 10–38)
BACTERIA URNS QL MICRO: ABNORMAL /HPF
BASOPHILS # BLD: 0 K/UL (ref 0–0.1)
BASOPHILS NFR BLD: 1 % (ref 0–2)
BILIRUB SERPL-MCNC: 0.5 MG/DL (ref 0.2–1)
BILIRUB UR QL: NEGATIVE
BUN SERPL-MCNC: 9 MG/DL (ref 7–18)
BUN/CREAT SERPL: 8 (ref 12–20)
CALCIUM SERPL-MCNC: 9.3 MG/DL (ref 8.5–10.1)
CHLORIDE SERPL-SCNC: 106 MMOL/L (ref 100–111)
CO2 SERPL-SCNC: 27 MMOL/L (ref 21–32)
COLOR UR: YELLOW
CREAT SERPL-MCNC: 1.09 MG/DL (ref 0.6–1.3)
DIFFERENTIAL METHOD BLD: ABNORMAL
EOSINOPHIL # BLD: 0 K/UL (ref 0–0.4)
EOSINOPHIL NFR BLD: 0 % (ref 0–5)
EPITH CASTS URNS QL MICRO: ABNORMAL /LPF (ref 0–5)
ERYTHROCYTE [DISTWIDTH] IN BLOOD BY AUTOMATED COUNT: 15.4 % (ref 11.6–14.5)
GLOBULIN SER CALC-MCNC: 3.3 G/DL (ref 2–4)
GLUCOSE SERPL-MCNC: 101 MG/DL (ref 74–99)
GLUCOSE UR STRIP.AUTO-MCNC: NEGATIVE MG/DL
HCG UR QL: NEGATIVE
HCT VFR BLD AUTO: 41.3 % (ref 35–45)
HGB BLD-MCNC: 13.5 G/DL (ref 12–16)
HGB UR QL STRIP: NEGATIVE
KETONES UR QL STRIP.AUTO: NEGATIVE MG/DL
LEUKOCYTE ESTERASE UR QL STRIP.AUTO: NEGATIVE
LIPASE SERPL-CCNC: 97 U/L (ref 73–393)
LYMPHOCYTES # BLD: 0.9 K/UL (ref 0.9–3.6)
LYMPHOCYTES NFR BLD: 16 % (ref 21–52)
MCH RBC QN AUTO: 26.9 PG (ref 24–34)
MCHC RBC AUTO-ENTMCNC: 32.7 G/DL (ref 31–37)
MCV RBC AUTO: 82.4 FL (ref 74–97)
MONOCYTES # BLD: 0.2 K/UL (ref 0.05–1.2)
MONOCYTES NFR BLD: 4 % (ref 3–10)
MUCOUS THREADS URNS QL MICRO: ABNORMAL /LPF
NEUTS SEG # BLD: 4.3 K/UL (ref 1.8–8)
NEUTS SEG NFR BLD: 79 % (ref 40–73)
NITRITE UR QL STRIP.AUTO: NEGATIVE
PH UR STRIP: >8.5 [PH] (ref 5–8)
PLATELET # BLD AUTO: 307 K/UL (ref 135–420)
PMV BLD AUTO: 10.3 FL (ref 9.2–11.8)
POTASSIUM SERPL-SCNC: 4.6 MMOL/L (ref 3.5–5.5)
PROT SERPL-MCNC: 7.3 G/DL (ref 6.4–8.2)
PROT UR STRIP-MCNC: 30 MG/DL
RBC # BLD AUTO: 5.01 M/UL (ref 4.2–5.3)
RBC #/AREA URNS HPF: ABNORMAL /HPF (ref 0–5)
SODIUM SERPL-SCNC: 141 MMOL/L (ref 136–145)
SP GR UR REFRACTOMETRY: 1.02 (ref 1–1.03)
UROBILINOGEN UR QL STRIP.AUTO: 1 EU/DL (ref 0.2–1)
WBC # BLD AUTO: 5.5 K/UL (ref 4.6–13.2)
WBC URNS QL MICRO: ABNORMAL /HPF (ref 0–5)

## 2021-05-24 PROCEDURE — 81025 URINE PREGNANCY TEST: CPT

## 2021-05-24 PROCEDURE — 99284 EMERGENCY DEPT VISIT MOD MDM: CPT

## 2021-05-24 PROCEDURE — 96374 THER/PROPH/DIAG INJ IV PUSH: CPT

## 2021-05-24 PROCEDURE — 81001 URINALYSIS AUTO W/SCOPE: CPT

## 2021-05-24 PROCEDURE — 85025 COMPLETE CBC W/AUTO DIFF WBC: CPT

## 2021-05-24 PROCEDURE — 80053 COMPREHEN METABOLIC PANEL: CPT

## 2021-05-24 PROCEDURE — 83690 ASSAY OF LIPASE: CPT

## 2021-05-24 PROCEDURE — 74011250636 HC RX REV CODE- 250/636: Performed by: EMERGENCY MEDICINE

## 2021-05-24 RX ORDER — ONDANSETRON 2 MG/ML
4 INJECTION INTRAMUSCULAR; INTRAVENOUS ONCE
Status: COMPLETED | OUTPATIENT
Start: 2021-05-24 | End: 2021-05-24

## 2021-05-24 RX ORDER — ONDANSETRON 4 MG/1
4 TABLET, ORALLY DISINTEGRATING ORAL
Qty: 20 TABLET | Refills: 0 | Status: SHIPPED | OUTPATIENT
Start: 2021-05-24

## 2021-05-24 RX ADMIN — ONDANSETRON 4 MG: 2 INJECTION INTRAMUSCULAR; INTRAVENOUS at 08:43

## 2021-05-24 RX ADMIN — SODIUM CHLORIDE 1000 ML: 900 INJECTION, SOLUTION INTRAVENOUS at 08:43

## 2021-05-24 NOTE — ED NOTES
Pt hourly rounding competed. Safety   Pt (*) resting on stretcher with side rails up and call bell in reach. () in chair    () in parents arms. Toileting   Pt offered ()Bedpan     ()Assistance to Restroom     ()Urinal  Ongoing Updates  Updated on plan of care and status of test results. Pain Management  Inquired as to comfort and offered comfort measures:    (**) warm blankets   (**) dimmed lights    Patient resting comfortably w/no needs at current time.

## 2021-05-24 NOTE — ED PROVIDER NOTES
EMERGENCY DEPARTMENT HISTORY AND PHYSICAL EXAM    Date: 5/24/2021  Patient Name: Rachelle Berrios    History of Presenting Illness     Chief Complaint   Patient presents with    Abdominal Pain       History Provided By: Patient     History Jennifer Shae):   8:22 AM  Susanna Carlos is a 23 y.o. female with PMHX of Alcohol abuse, asthma, depression, vape, PTSD who presents to the emergency department C/O abdominal pain onset 1 week ago. Associated sxs include nausea and vomiting. Pt denies fever, chills or any other sxs or complaints. Patient states pain is worse around her bellybutton and in the lower areas. She has been vomiting on and off for about a week. Now she has streaks of blood in her vomit. Patient states that she has been drinking last night. She drank 2 mixed drinks last night. Chief Complaint: Abdominal pain  Onset: 1 week  Timing: Acute  Context: Patient does not know what brought symptoms on, symptoms have worsened since onset  Location: Lower abdomen  Quality: Sharp  Severity: 8 out of 10  Modifying Factors: Nothing makes it better, or worse. Associated Symptoms: Nausea and vomiting    PCP: Jasson Horne MD    Current Outpatient Medications   Medication Sig Dispense Refill    ondansetron (Zofran ODT) 4 mg disintegrating tablet Take 1 Tablet by mouth every eight (8) hours as needed for Nausea. 20 Tablet 0    ARIPiprazole (ABILIFY) 15 mg tablet Take 1 Tab by mouth nightly. 30 Tab 0    buPROPion XL (WELLBUTRIN XL) 300 mg XL tablet Take 1 Tab by mouth every morning. 30 Tab 0    hydrOXYzine pamoate (VISTARIL) 50 mg capsule Take 1 Cap by mouth two (2) times daily as needed for Anxiety. 60 Cap 0    mirtazapine (REMERON) 15 mg tablet Take 1 Tab by mouth nightly. 30 Tab 0    naltrexone (DEPADE) 50 mg tablet Take 1 Tab by mouth daily. 30 Tab 0    nicotine (NICODERM CQ) 21 mg/24 hr 1 Patch by TransDERmal route daily as needed (nicotine replacement) for up to 30 days.  30 Patch 0    prazosin (MINIPRESS) 2 mg capsule Take 1 Cap by mouth nightly. 30 Cap 0    albuterol (PROVENTIL HFA, VENTOLIN HFA, PROAIR HFA) 90 mcg/actuation inhaler Take 2 Puffs by inhalation every six (6) hours as needed for Wheezing.  fluticasone propion-salmeteroL (Advair Diskus) 250-50 mcg/dose diskus inhaler Take 1 Puff by inhalation two (2) times a day. Past History         Past Medical History:  Past Medical History:   Diagnosis Date    Alcohol abuse     Asthma     Depression     Nicotine vapor product user     PTSD (post-traumatic stress disorder)        Past Surgical History:  Past Surgical History:   Procedure Laterality Date    HX APPENDECTOMY      HX APPENDECTOMY      HX ORTHOPAEDIC      carpal tunnel release       Family History:  History reviewed. No pertinent family history. Reviewed and non-contributory    Social History:  Social History     Tobacco Use    Smoking status: Current Every Day Smoker     Packs/day: 0.25    Smokeless tobacco: Never Used   Vaping Use    Vaping Use: Never assessed   Substance Use Topics    Alcohol use: Yes     Comment: 1 pint of whiskey daily    Drug use: Never       Allergies:  No Known Allergies      Review of Systems      Review of Systems   Constitutional: Negative for chills and fever. HENT: Negative for congestion, rhinorrhea and sore throat. Eyes: Negative for pain and visual disturbance. Respiratory: Negative for cough, shortness of breath and wheezing. Cardiovascular: Negative for chest pain and palpitations. Gastrointestinal: Positive for abdominal pain, nausea and vomiting. Negative for diarrhea. Genitourinary: Negative for dysuria, flank pain, frequency and urgency. Musculoskeletal: Negative for arthralgias and myalgias. Skin: Negative for rash and wound. Neurological: Negative for speech difficulty, weakness, light-headedness and headaches. Psychiatric/Behavioral: Negative for agitation and confusion.    All other systems reviewed and are negative. Physical Exam     Vitals:    05/24/21 0752   BP: 121/67   Pulse: 78   Resp: 18   Temp: 97.9 °F (36.6 °C)   SpO2: 100%   Weight: 72.6 kg (160 lb)   Height: 5' 4\" (1.626 m)       Physical Exam  Vitals and nursing note reviewed. Constitutional:       General: She is not in acute distress. Appearance: Normal appearance. She is normal weight. She is not ill-appearing. HENT:      Head: Normocephalic and atraumatic. Nose: Nose normal. No rhinorrhea. Mouth/Throat:      Mouth: Mucous membranes are moist.      Pharynx: No oropharyngeal exudate or posterior oropharyngeal erythema. Eyes:      Extraocular Movements: Extraocular movements intact. Conjunctiva/sclera: Conjunctivae normal.      Pupils: Pupils are equal, round, and reactive to light. Cardiovascular:      Rate and Rhythm: Normal rate and regular rhythm. Heart sounds: No murmur heard. No friction rub. No gallop. Pulmonary:      Effort: Pulmonary effort is normal. No respiratory distress. Breath sounds: Normal breath sounds. No wheezing, rhonchi or rales. Abdominal:      General: Bowel sounds are normal.      Palpations: Abdomen is soft. Tenderness: There is abdominal tenderness in the right lower quadrant and left lower quadrant. There is no guarding or rebound. Musculoskeletal:         General: No swelling, tenderness or deformity. Normal range of motion. Cervical back: Normal range of motion and neck supple. No rigidity. Lymphadenopathy:      Cervical: No cervical adenopathy. Skin:     General: Skin is warm and dry. Findings: No rash. Neurological:      General: No focal deficit present. Mental Status: She is alert and oriented to person, place, and time.    Psychiatric:         Mood and Affect: Mood normal.         Behavior: Behavior normal.         Diagnostic Study Results     Labs -     Recent Results (from the past 12 hour(s))   URINALYSIS W/ RFLX MICROSCOPIC    Collection Time: 05/24/21  8:15 AM   Result Value Ref Range    Color YELLOW      Appearance CLEAR      Specific gravity 1.024 1.005 - 1.030      pH (UA) >8.5 (H) 5.0 - 8.0    Protein 30 (A) NEG mg/dL    Glucose Negative NEG mg/dL    Ketone Negative NEG mg/dL    Bilirubin Negative NEG      Blood Negative NEG      Urobilinogen 1.0 0.2 - 1.0 EU/dL    Nitrites Negative NEG      Leukocyte Esterase Negative NEG     HCG URINE, QL    Collection Time: 05/24/21  8:15 AM   Result Value Ref Range    HCG urine, QL Negative NEG     URINE MICROSCOPIC ONLY    Collection Time: 05/24/21  8:15 AM   Result Value Ref Range    WBC 0 to 3 0 - 5 /hpf    RBC 0 to 3 0 - 5 /hpf    Epithelial cells 3+ 0 - 5 /lpf    Bacteria 3+ (A) NEG /hpf    Mucus 4+ (A) NEG /lpf   CBC WITH AUTOMATED DIFF    Collection Time: 05/24/21  8:50 AM   Result Value Ref Range    WBC 5.5 4.6 - 13.2 K/uL    RBC 5.01 4.20 - 5.30 M/uL    HGB 13.5 12.0 - 16.0 g/dL    HCT 41.3 35.0 - 45.0 %    MCV 82.4 74.0 - 97.0 FL    MCH 26.9 24.0 - 34.0 PG    MCHC 32.7 31.0 - 37.0 g/dL    RDW 15.4 (H) 11.6 - 14.5 %    PLATELET 095 088 - 111 K/uL    MPV 10.3 9.2 - 11.8 FL    NEUTROPHILS 79 (H) 40 - 73 %    LYMPHOCYTES 16 (L) 21 - 52 %    MONOCYTES 4 3 - 10 %    EOSINOPHILS 0 0 - 5 %    BASOPHILS 1 0 - 2 %    ABS. NEUTROPHILS 4.3 1.8 - 8.0 K/UL    ABS. LYMPHOCYTES 0.9 0.9 - 3.6 K/UL    ABS. MONOCYTES 0.2 0.05 - 1.2 K/UL    ABS. EOSINOPHILS 0.0 0.0 - 0.4 K/UL    ABS.  BASOPHILS 0.0 0.0 - 0.1 K/UL    DF AUTOMATED     LIPASE    Collection Time: 05/24/21  8:50 AM   Result Value Ref Range    Lipase 97 73 - 646 U/L   METABOLIC PANEL, COMPREHENSIVE    Collection Time: 05/24/21  8:50 AM   Result Value Ref Range    Sodium 141 136 - 145 mmol/L    Potassium 4.6 3.5 - 5.5 mmol/L    Chloride 106 100 - 111 mmol/L    CO2 27 21 - 32 mmol/L    Anion gap 8 3.0 - 18 mmol/L    Glucose 101 (H) 74 - 99 mg/dL    BUN 9 7.0 - 18 MG/DL    Creatinine 1.09 0.6 - 1.3 MG/DL    BUN/Creatinine ratio 8 (L) 12 - 20      GFR est AA >60 >60 ml/min/1.73m2    GFR est non-AA >60 >60 ml/min/1.73m2    Calcium 9.3 8.5 - 10.1 MG/DL    Bilirubin, total 0.5 0.2 - 1.0 MG/DL    ALT (SGPT) 28 13 - 56 U/L    AST (SGOT) 28 10 - 38 U/L    Alk. phosphatase 51 45 - 117 U/L    Protein, total 7.3 6.4 - 8.2 g/dL    Albumin 4.0 3.4 - 5.0 g/dL    Globulin 3.3 2.0 - 4.0 g/dL    A-G Ratio 1.2 0.8 - 1.7         Radiologic Studies -   No orders to display     CT Results  (Last 48 hours)    None        CXR Results  (Last 48 hours)    None          Medications given in the ED-  Medications   sodium chloride 0.9 % bolus infusion 1,000 mL (1,000 mL IntraVENous New Bag 5/24/21 0843)   ondansetron (ZOFRAN) injection 4 mg (4 mg IntraVENous Given 5/24/21 0843)         Medical Decision Making   I am the first provider for this patient. I reviewed the vital signs, available nursing notes, past medical history, past surgical history, family history and social history. Vital Signs-Reviewed the patient's vital signs. Pulse Oximetry Analysis - 100% on RA     Cardiac Monitor:  Rate: 78 bpm  Rhythm: sinus rhythm    Records Reviewed: Nursing Notes    Provider Notes (Medical Decision Making):   DDX: Nausea, vomiting, cholecystitis, pancreatitis, peptic ulcer disease    Procedures:  Procedures    ED Course:   8:22 AM Initial assessment performed. The patients presenting problems have been discussed, and they are in agreement with the care plan formulated and outlined with them. I have encouraged them to ask questions as they arise throughout their visit. Diagnosis and Disposition     Discussion:  23 y.o. female with lower abdominal pain, nausea, vomiting mild amount of blood in the emesis after vomiting for a week. Patient has a normal H&H, normal white blood cell count. Patient is an appendectomy in the past so very unlikely a recurrent appendicitis especially considering low white blood cell count.   Very unlikely cholecystitis with no upper abdominal pain and nonsurgical appearing abdomen on exam.  We will continue to treat her nausea and vomiting as an outpatient. Patient may follow-up with her primary care doctor at the [facility]. Patient understands agrees this plan. DISCHARGE NOTE:  10:04 AM   Susanna Carlos's  results have been reviewed with her. She has been counseled regarding her diagnosis, treatment, and plan. She verbally conveys understanding and agreement of the signs, symptoms, diagnosis, treatment and prognosis and additionally agrees to follow up as discussed. She also agrees with the care-plan and conveys that all of her questions have been answered. I have also provided discharge instructions for her that include: educational information regarding their diagnosis and treatment, and list of reasons why they would want to return to the ED prior to their follow-up appointment, should her condition change. She has been provided with education for proper emergency department utilization. CLINICAL IMPRESSION:    1. Non-intractable vomiting with nausea, unspecified vomiting type        PLAN:  1. D/C Home  2. Current Discharge Medication List      START taking these medications    Details   ondansetron (Zofran ODT) 4 mg disintegrating tablet Take 1 Tablet by mouth every eight (8) hours as needed for Nausea. Qty: 20 Tablet, Refills: 0  Start date: 5/24/2021           3. Follow-up Information     Follow up With Specialties Details Why [address]  Schedule an appointment as soon as possible for a visit in 1 week For follow up from Emergency Department visit. [address] [address] [phone]    [facility] EMERGENCY DEPT Emergency Medicine  As needed; If symptoms worsen [address] [id number] [address] [place]     Please note that this dictation was completed with [place], the [employer] voice recognition software.   Quite often unanticipated grammatical, syntax, homophones, and other interpretive errors are inadvertently transcribed by the computer software. Please disregard these errors. Please excuse any errors that have escaped final proofreading.     Isidro Ravi MD